# Patient Record
Sex: MALE | Race: WHITE | NOT HISPANIC OR LATINO
[De-identification: names, ages, dates, MRNs, and addresses within clinical notes are randomized per-mention and may not be internally consistent; named-entity substitution may affect disease eponyms.]

---

## 2017-03-23 DIAGNOSIS — Z12.11 ENCOUNTER FOR SCREENING FOR MALIGNANT NEOPLASM OF COLON: ICD-10-CM

## 2017-03-27 ENCOUNTER — OTHER (OUTPATIENT)
Age: 52
End: 2017-03-27

## 2017-04-24 ENCOUNTER — APPOINTMENT (OUTPATIENT)
Dept: SURGERY | Facility: HOSPITAL | Age: 52
End: 2017-04-24

## 2017-04-24 ENCOUNTER — OUTPATIENT (OUTPATIENT)
Dept: OUTPATIENT SERVICES | Facility: HOSPITAL | Age: 52
LOS: 1 days | End: 2017-04-24
Payer: COMMERCIAL

## 2017-04-24 DIAGNOSIS — Z12.11 ENCOUNTER FOR SCREENING FOR MALIGNANT NEOPLASM OF COLON: ICD-10-CM

## 2017-04-24 PROCEDURE — 45378 DIAGNOSTIC COLONOSCOPY: CPT

## 2017-04-26 ENCOUNTER — APPOINTMENT (OUTPATIENT)
Age: 52
End: 2017-04-26

## 2017-04-26 VITALS
BODY MASS INDEX: 27.92 KG/M2 | WEIGHT: 195 LBS | SYSTOLIC BLOOD PRESSURE: 142 MMHG | HEART RATE: 82 BPM | HEIGHT: 70 IN | DIASTOLIC BLOOD PRESSURE: 91 MMHG | OXYGEN SATURATION: 98 %

## 2017-04-26 RX ORDER — SODIUM PICOSULFATE, MAGNESIUM OXIDE, AND ANHYDROUS CITRIC ACID 10; 3.5; 12 MG/16.2G; G/16.2G; G/16.2G
10-3.5-12 POWDER, METERED ORAL
Qty: 1 | Refills: 0 | Status: DISCONTINUED | COMMUNITY
Start: 2017-03-24 | End: 2017-04-26

## 2017-05-15 ENCOUNTER — FORM ENCOUNTER (OUTPATIENT)
Age: 52
End: 2017-05-15

## 2017-05-16 ENCOUNTER — APPOINTMENT (OUTPATIENT)
Dept: MRI IMAGING | Facility: CLINIC | Age: 52
End: 2017-05-16

## 2017-05-16 ENCOUNTER — OUTPATIENT (OUTPATIENT)
Dept: OUTPATIENT SERVICES | Facility: HOSPITAL | Age: 52
LOS: 1 days | End: 2017-05-16
Payer: COMMERCIAL

## 2017-05-16 DIAGNOSIS — Z00.8 ENCOUNTER FOR OTHER GENERAL EXAMINATION: ICD-10-CM

## 2017-05-16 PROCEDURE — A9585: CPT

## 2017-05-16 PROCEDURE — 74183 MRI ABD W/O CNTR FLWD CNTR: CPT

## 2017-07-17 ENCOUNTER — OTHER (OUTPATIENT)
Age: 52
End: 2017-07-17

## 2017-07-17 LAB
AFP-TM SERPL-MCNC: 1.1 NG/ML
ALBUMIN SERPL ELPH-MCNC: 4.3 G/DL
ALP BLD-CCNC: 66 U/L
ALT SERPL-CCNC: 38 U/L
ANION GAP SERPL CALC-SCNC: 15 MMOL/L
AST SERPL-CCNC: 29 U/L
BASOPHILS # BLD AUTO: 0.01 K/UL
BASOPHILS NFR BLD AUTO: 0.2 %
BILIRUB SERPL-MCNC: 0.7 MG/DL
BUN SERPL-MCNC: 18 MG/DL
CALCIUM SERPL-MCNC: 9.3 MG/DL
CHLORIDE SERPL-SCNC: 105 MMOL/L
CHOLEST SERPL-MCNC: 217 MG/DL
CHOLEST/HDLC SERPL: 2.9 RATIO
CO2 SERPL-SCNC: 22 MMOL/L
CREAT SERPL-MCNC: 1.01 MG/DL
EOSINOPHIL # BLD AUTO: 0.16 K/UL
EOSINOPHIL NFR BLD AUTO: 2.5 %
ESTIMATED AVERAGE GLUCOSE: 105 MG/DL
FERRITIN SERPL-MCNC: 790 NG/ML
GGT SERPL-CCNC: 23 U/L
GLUCOSE SERPL-MCNC: 120 MG/DL
HBA1C MFR BLD HPLC: 5.3 %
HCT VFR BLD CALC: 42 %
HDLC SERPL-MCNC: 75 MG/DL
HGB BLD-MCNC: 14.6 G/DL
IMM GRANULOCYTES NFR BLD AUTO: 0.2 %
INR PPP: 0.97 RATIO
IRON SATN MFR SERPL: 33 %
IRON SERPL-MCNC: 85 UG/DL
LDLC SERPL CALC-MCNC: 126 MG/DL
LYMPHOCYTES # BLD AUTO: 2.26 K/UL
LYMPHOCYTES NFR BLD AUTO: 34.8 %
MAN DIFF?: NORMAL
MCHC RBC-ENTMCNC: 29.9 PG
MCHC RBC-ENTMCNC: 34.8 GM/DL
MCV RBC AUTO: 85.9 FL
MONOCYTES # BLD AUTO: 0.65 K/UL
MONOCYTES NFR BLD AUTO: 10 %
NEUTROPHILS # BLD AUTO: 3.4 K/UL
NEUTROPHILS NFR BLD AUTO: 52.3 %
PLATELET # BLD AUTO: 209 K/UL
POTASSIUM SERPL-SCNC: 4.3 MMOL/L
PROT SERPL-MCNC: 6.8 G/DL
PT BLD: 11 SEC
RBC # BLD: 4.89 M/UL
RBC # FLD: 12.6 %
SODIUM SERPL-SCNC: 142 MMOL/L
TIBC SERPL-MCNC: 254 UG/DL
TRIGL SERPL-MCNC: 79 MG/DL
UIBC SERPL-MCNC: 169 UG/DL
WBC # FLD AUTO: 6.49 K/UL

## 2017-07-20 ENCOUNTER — APPOINTMENT (OUTPATIENT)
Age: 52
End: 2017-07-20

## 2017-08-18 LAB — TM INTERPRETATION: NORMAL

## 2017-09-13 ENCOUNTER — APPOINTMENT (OUTPATIENT)
Age: 52
End: 2017-09-13

## 2017-09-13 ENCOUNTER — APPOINTMENT (OUTPATIENT)
Age: 52
End: 2017-09-13
Payer: COMMERCIAL

## 2017-09-13 VITALS
OXYGEN SATURATION: 98 % | HEART RATE: 86 BPM | HEIGHT: 70 IN | RESPIRATION RATE: 12 BRPM | TEMPERATURE: 98.9 F | WEIGHT: 196 LBS | BODY MASS INDEX: 28.06 KG/M2 | DIASTOLIC BLOOD PRESSURE: 98 MMHG | SYSTOLIC BLOOD PRESSURE: 136 MMHG

## 2017-09-13 PROCEDURE — 99214 OFFICE O/P EST MOD 30 MIN: CPT

## 2017-11-15 LAB
BASOPHILS # BLD AUTO: 0.01 K/UL
BASOPHILS NFR BLD AUTO: 0.2 %
EOSINOPHIL # BLD AUTO: 0.13 K/UL
EOSINOPHIL NFR BLD AUTO: 2.4
HCT VFR BLD CALC: 41.2 %
HGB BLD-MCNC: 13.8 G/DL
IMM GRANULOCYTES NFR BLD AUTO: 0.4 %
LYMPHOCYTES # BLD AUTO: 2.17 K/UL
LYMPHOCYTES NFR BLD AUTO: 40.7 %
MAN DIFF?: NORMAL
MCHC RBC-ENTMCNC: 30.3 PG
MCHC RBC-ENTMCNC: 33.5 GM/DL
MCV RBC AUTO: 90.5 FL
MONOCYTES # BLD AUTO: 0.34 K/UL
MONOCYTES NFR BLD AUTO: 6.4 %
NEUTROPHILS # BLD AUTO: 2.66 K/UL
NEUTROPHILS NFR BLD AUTO: 49.9 %
PLATELET # BLD AUTO: 220 K/UL
RBC # BLD: 4.55 M/UL
RBC # FLD: 13.2 %
WBC # FLD AUTO: 5.33 K/UL

## 2017-11-16 LAB
ALBUMIN SERPL ELPH-MCNC: 4.6 G/DL
ALP BLD-CCNC: 61 U/L
ALT SERPL-CCNC: 41 U/L
ANION GAP SERPL CALC-SCNC: 15 MMOL/L
AST SERPL-CCNC: 32 U/L
BILIRUB SERPL-MCNC: 0.5 MG/DL
BUN SERPL-MCNC: 23 MG/DL
CALCIUM SERPL-MCNC: 9.3 MG/DL
CHLORIDE SERPL-SCNC: 104 MMOL/L
CO2 SERPL-SCNC: 22 MMOL/L
CREAT SERPL-MCNC: 1.07 MG/DL
FERRITIN SERPL-MCNC: 374 NG/ML
GLUCOSE SERPL-MCNC: 108 MG/DL
IRON SATN MFR SERPL: 24 %
IRON SERPL-MCNC: 70 UG/DL
POTASSIUM SERPL-SCNC: 4.3 MMOL/L
PROT SERPL-MCNC: 6.7 G/DL
SODIUM SERPL-SCNC: 141 MMOL/L
TIBC SERPL-MCNC: 290 UG/DL
UIBC SERPL-MCNC: 220 UG/DL

## 2018-02-01 ENCOUNTER — APPOINTMENT (OUTPATIENT)
Dept: ULTRASOUND IMAGING | Facility: CLINIC | Age: 53
End: 2018-02-01
Payer: COMMERCIAL

## 2018-02-01 ENCOUNTER — OUTPATIENT (OUTPATIENT)
Dept: OUTPATIENT SERVICES | Facility: HOSPITAL | Age: 53
LOS: 1 days | End: 2018-02-01
Payer: COMMERCIAL

## 2018-02-01 DIAGNOSIS — Z00.8 ENCOUNTER FOR OTHER GENERAL EXAMINATION: ICD-10-CM

## 2018-02-01 DIAGNOSIS — E83.110 HEREDITARY HEMOCHROMATOSIS: ICD-10-CM

## 2018-02-01 PROCEDURE — 76700 US EXAM ABDOM COMPLETE: CPT

## 2018-02-01 PROCEDURE — 76700 US EXAM ABDOM COMPLETE: CPT | Mod: 26

## 2018-02-06 LAB
ALBUMIN SERPL ELPH-MCNC: 4.5 G/DL
ALP BLD-CCNC: 57 U/L
ALT SERPL-CCNC: 35 U/L
ANION GAP SERPL CALC-SCNC: 15 MMOL/L
AST SERPL-CCNC: 24 U/L
BILIRUB SERPL-MCNC: 0.5 MG/DL
BUN SERPL-MCNC: 19 MG/DL
CALCIUM SERPL-MCNC: 9.2 MG/DL
CHLORIDE SERPL-SCNC: 103 MMOL/L
CO2 SERPL-SCNC: 23 MMOL/L
CREAT SERPL-MCNC: 1.17 MG/DL
FERRITIN SERPL-MCNC: 65 NG/ML
GLUCOSE SERPL-MCNC: 110 MG/DL
IRON SATN MFR SERPL: 33 %
IRON SERPL-MCNC: 115 UG/DL
POTASSIUM SERPL-SCNC: 4.4 MMOL/L
PROT SERPL-MCNC: 6.6 G/DL
SODIUM SERPL-SCNC: 141 MMOL/L
TIBC SERPL-MCNC: 353 UG/DL
UIBC SERPL-MCNC: 238 UG/DL

## 2018-02-08 LAB
BASOPHILS # BLD AUTO: 0.02 K/UL
BASOPHILS NFR BLD AUTO: 0.4 %
EOSINOPHIL # BLD AUTO: 0.18 K/UL
EOSINOPHIL NFR BLD AUTO: 3.3 %
HCT VFR BLD CALC: 43.6 %
HGB BLD-MCNC: 13.8 G/DL
IMM GRANULOCYTES NFR BLD AUTO: 0.4 %
LYMPHOCYTES # BLD AUTO: 2.05 K/UL
LYMPHOCYTES NFR BLD AUTO: 37.5 %
MAN DIFF?: NORMAL
MCHC RBC-ENTMCNC: 28.2 PG
MCHC RBC-ENTMCNC: 31.7 GM/DL
MCV RBC AUTO: 89 FL
MONOCYTES # BLD AUTO: 0.42 K/UL
MONOCYTES NFR BLD AUTO: 7.7 %
NEUTROPHILS # BLD AUTO: 2.77 K/UL
NEUTROPHILS NFR BLD AUTO: 50.7 %
PLATELET # BLD AUTO: 204 K/UL
RBC # BLD: 4.9 M/UL
RBC # FLD: 12.8 %
WBC # FLD AUTO: 5.46 K/UL

## 2018-02-14 ENCOUNTER — APPOINTMENT (OUTPATIENT)
Age: 53
End: 2018-02-14
Payer: COMMERCIAL

## 2018-02-14 VITALS
BODY MASS INDEX: 28.06 KG/M2 | HEART RATE: 71 BPM | WEIGHT: 196 LBS | SYSTOLIC BLOOD PRESSURE: 136 MMHG | DIASTOLIC BLOOD PRESSURE: 92 MMHG | OXYGEN SATURATION: 98 % | TEMPERATURE: 98.4 F | HEIGHT: 70 IN

## 2018-02-14 PROCEDURE — 99214 OFFICE O/P EST MOD 30 MIN: CPT

## 2018-03-15 ENCOUNTER — APPOINTMENT (OUTPATIENT)
Dept: SURGERY | Facility: CLINIC | Age: 53
End: 2018-03-15
Payer: COMMERCIAL

## 2018-03-15 VITALS
HEART RATE: 73 BPM | SYSTOLIC BLOOD PRESSURE: 141 MMHG | OXYGEN SATURATION: 98 % | DIASTOLIC BLOOD PRESSURE: 97 MMHG | TEMPERATURE: 98.7 F | RESPIRATION RATE: 14 BRPM

## 2018-03-15 DIAGNOSIS — K60.4 RECTAL FISTULA: ICD-10-CM

## 2018-03-15 DIAGNOSIS — Z83.79 FAMILY HISTORY OF OTHER DISEASES OF THE DIGESTIVE SYSTEM: ICD-10-CM

## 2018-03-15 DIAGNOSIS — K62.5 HEMORRHAGE OF ANUS AND RECTUM: ICD-10-CM

## 2018-03-15 DIAGNOSIS — K62.89 OTHER SPECIFIED DISEASES OF ANUS AND RECTUM: ICD-10-CM

## 2018-03-15 DIAGNOSIS — K64.1 SECOND DEGREE HEMORRHOIDS: ICD-10-CM

## 2018-03-15 DIAGNOSIS — K64.9 UNSPECIFIED HEMORRHOIDS: ICD-10-CM

## 2018-03-15 PROCEDURE — 99214 OFFICE O/P EST MOD 30 MIN: CPT | Mod: 25

## 2018-03-15 PROCEDURE — 46221 LIGATION OF HEMORRHOID(S): CPT

## 2018-06-17 ENCOUNTER — FORM ENCOUNTER (OUTPATIENT)
Age: 53
End: 2018-06-17

## 2018-06-18 ENCOUNTER — APPOINTMENT (OUTPATIENT)
Dept: MRI IMAGING | Facility: CLINIC | Age: 53
End: 2018-06-18

## 2018-06-18 ENCOUNTER — OUTPATIENT (OUTPATIENT)
Dept: OUTPATIENT SERVICES | Facility: HOSPITAL | Age: 53
LOS: 1 days | End: 2018-06-18
Payer: COMMERCIAL

## 2018-06-18 DIAGNOSIS — Z00.8 ENCOUNTER FOR OTHER GENERAL EXAMINATION: ICD-10-CM

## 2018-06-18 PROCEDURE — 74183 MRI ABD W/O CNTR FLWD CNTR: CPT | Mod: 26

## 2018-06-18 PROCEDURE — 74183 MRI ABD W/O CNTR FLWD CNTR: CPT

## 2018-06-18 PROCEDURE — A9585: CPT

## 2018-06-26 LAB
AFP-TM SERPL-MCNC: 1.3 NG/ML
ALBUMIN SERPL ELPH-MCNC: 4.8 G/DL
ALP BLD-CCNC: 66 U/L
ALT SERPL-CCNC: 60 U/L
ANION GAP SERPL CALC-SCNC: 15 MMOL/L
AST SERPL-CCNC: 41 U/L
BASOPHILS # BLD AUTO: 0.02 K/UL
BASOPHILS NFR BLD AUTO: 0.3 %
BILIRUB SERPL-MCNC: 0.6 MG/DL
BUN SERPL-MCNC: 21 MG/DL
CALCIUM SERPL-MCNC: 9.6 MG/DL
CHLORIDE SERPL-SCNC: 104 MMOL/L
CHOLEST SERPL-MCNC: 235 MG/DL
CHOLEST/HDLC SERPL: 3.1 RATIO
CO2 SERPL-SCNC: 23 MMOL/L
CREAT SERPL-MCNC: 1.14 MG/DL
EOSINOPHIL # BLD AUTO: 0.12 K/UL
EOSINOPHIL NFR BLD AUTO: 2 %
ESTIMATED AVERAGE GLUCOSE: 108 MG/DL
FERRITIN SERPL-MCNC: 54 NG/ML
GGT SERPL-CCNC: 26 U/L
GLUCOSE SERPL-MCNC: 111 MG/DL
HBA1C MFR BLD HPLC: 5.4 %
HCT VFR BLD CALC: 45.2 %
HDLC SERPL-MCNC: 75 MG/DL
HGB BLD-MCNC: 15.1 G/DL
IMM GRANULOCYTES NFR BLD AUTO: 0.2 %
IRON SATN MFR SERPL: 25 %
IRON SERPL-MCNC: 91 UG/DL
LDLC SERPL CALC-MCNC: 144 MG/DL
LYMPHOCYTES # BLD AUTO: 2.16 K/UL
LYMPHOCYTES NFR BLD AUTO: 35.9 %
MAN DIFF?: NORMAL
MCHC RBC-ENTMCNC: 28.9 PG
MCHC RBC-ENTMCNC: 33.4 GM/DL
MCV RBC AUTO: 86.4 FL
MONOCYTES # BLD AUTO: 0.47 K/UL
MONOCYTES NFR BLD AUTO: 7.8 %
NEUTROPHILS # BLD AUTO: 3.23 K/UL
NEUTROPHILS NFR BLD AUTO: 53.8 %
PLATELET # BLD AUTO: 219 K/UL
POTASSIUM SERPL-SCNC: 4.6 MMOL/L
PROT SERPL-MCNC: 7.1 G/DL
RBC # BLD: 5.23 M/UL
RBC # FLD: 13 %
SODIUM SERPL-SCNC: 142 MMOL/L
TIBC SERPL-MCNC: 369 UG/DL
TRIGL SERPL-MCNC: 78 MG/DL
TSH SERPL-ACNC: 1.79 UIU/ML
UIBC SERPL-MCNC: 278 UG/DL
WBC # FLD AUTO: 6.01 K/UL

## 2018-07-11 ENCOUNTER — APPOINTMENT (OUTPATIENT)
Dept: HEPATOLOGY | Facility: CLINIC | Age: 53
End: 2018-07-11
Payer: COMMERCIAL

## 2018-07-11 VITALS
RESPIRATION RATE: 14 BRPM | WEIGHT: 194 LBS | OXYGEN SATURATION: 98 % | HEART RATE: 86 BPM | HEIGHT: 70 IN | SYSTOLIC BLOOD PRESSURE: 156 MMHG | BODY MASS INDEX: 27.77 KG/M2 | DIASTOLIC BLOOD PRESSURE: 104 MMHG

## 2018-07-11 PROCEDURE — 99214 OFFICE O/P EST MOD 30 MIN: CPT

## 2018-07-12 LAB — FERRITIN SERPL-MCNC: 79 NG/ML

## 2018-09-05 ENCOUNTER — OTHER (OUTPATIENT)
Age: 53
End: 2018-09-05

## 2018-11-20 ENCOUNTER — LABORATORY RESULT (OUTPATIENT)
Age: 53
End: 2018-11-20

## 2018-12-05 ENCOUNTER — APPOINTMENT (OUTPATIENT)
Dept: HEPATOLOGY | Facility: CLINIC | Age: 53
End: 2018-12-05
Payer: COMMERCIAL

## 2018-12-05 VITALS
HEART RATE: 83 BPM | HEIGHT: 70 IN | DIASTOLIC BLOOD PRESSURE: 93 MMHG | WEIGHT: 198 LBS | BODY MASS INDEX: 28.35 KG/M2 | OXYGEN SATURATION: 98 % | SYSTOLIC BLOOD PRESSURE: 147 MMHG

## 2018-12-05 DIAGNOSIS — Z83.49 FAMILY HISTORY OF OTHER ENDOCRINE, NUTRITIONAL AND METABOLIC DISEASES: ICD-10-CM

## 2018-12-05 PROCEDURE — 99214 OFFICE O/P EST MOD 30 MIN: CPT

## 2018-12-06 ENCOUNTER — APPOINTMENT (OUTPATIENT)
Dept: HEPATOLOGY | Facility: CLINIC | Age: 53
End: 2018-12-06

## 2019-05-07 LAB
AFP-TM SERPL-MCNC: <1.8 NG/ML
ALBUMIN SERPL ELPH-MCNC: 4.9 G/DL
ALP BLD-CCNC: 82 U/L
ALT SERPL-CCNC: 64 U/L
ANION GAP SERPL CALC-SCNC: 11 MMOL/L
AST SERPL-CCNC: 36 U/L
BASOPHILS # BLD AUTO: 0.03 K/UL
BASOPHILS NFR BLD AUTO: 0.5 %
BILIRUB SERPL-MCNC: 0.3 MG/DL
BUN SERPL-MCNC: 26 MG/DL
CALCIUM SERPL-MCNC: 9.7 MG/DL
CHLORIDE SERPL-SCNC: 103 MMOL/L
CHOLEST SERPL-MCNC: 263 MG/DL
CHOLEST/HDLC SERPL: 3.8 RATIO
CO2 SERPL-SCNC: 24 MMOL/L
CREAT SERPL-MCNC: 1.07 MG/DL
EOSINOPHIL # BLD AUTO: 0.2 K/UL
EOSINOPHIL NFR BLD AUTO: 3.4 %
ESTIMATED AVERAGE GLUCOSE: 111 MG/DL
FERRITIN SERPL-MCNC: 203 NG/ML
GLUCOSE SERPL-MCNC: 115 MG/DL
HBA1C MFR BLD HPLC: 5.5 %
HCT VFR BLD CALC: 45.2 %
HDLC SERPL-MCNC: 69 MG/DL
HGB BLD-MCNC: 15.4 G/DL
IMM GRANULOCYTES NFR BLD AUTO: 0.3 %
INR PPP: 0.91 RATIO
IRON SATN MFR SERPL: 18 %
IRON SERPL-MCNC: 61 UG/DL
LDLC SERPL CALC-MCNC: 178 MG/DL
LYMPHOCYTES # BLD AUTO: 2.05 K/UL
LYMPHOCYTES NFR BLD AUTO: 35.2 %
MAN DIFF?: NORMAL
MCHC RBC-ENTMCNC: 29.9 PG
MCHC RBC-ENTMCNC: 34.1 GM/DL
MCV RBC AUTO: 87.8 FL
MONOCYTES # BLD AUTO: 0.49 K/UL
MONOCYTES NFR BLD AUTO: 8.4 %
NEUTROPHILS # BLD AUTO: 3.03 K/UL
NEUTROPHILS NFR BLD AUTO: 52.2 %
PLATELET # BLD AUTO: 180 K/UL
POTASSIUM SERPL-SCNC: 4.9 MMOL/L
PROT SERPL-MCNC: 7.1 G/DL
PT BLD: 10.3 SEC
RBC # BLD: 5.15 M/UL
RBC # FLD: 12.5 %
SODIUM SERPL-SCNC: 138 MMOL/L
TIBC SERPL-MCNC: 333 UG/DL
TRIGL SERPL-MCNC: 78 MG/DL
UIBC SERPL-MCNC: 272 UG/DL
WBC # FLD AUTO: 5.82 K/UL

## 2019-05-27 ENCOUNTER — FORM ENCOUNTER (OUTPATIENT)
Age: 54
End: 2019-05-27

## 2019-05-28 ENCOUNTER — APPOINTMENT (OUTPATIENT)
Dept: ULTRASOUND IMAGING | Facility: CLINIC | Age: 54
End: 2019-05-28
Payer: COMMERCIAL

## 2019-05-28 ENCOUNTER — OUTPATIENT (OUTPATIENT)
Dept: OUTPATIENT SERVICES | Facility: HOSPITAL | Age: 54
LOS: 1 days | End: 2019-05-28
Payer: COMMERCIAL

## 2019-05-28 DIAGNOSIS — K76.0 FATTY (CHANGE OF) LIVER, NOT ELSEWHERE CLASSIFIED: ICD-10-CM

## 2019-05-28 PROCEDURE — 76700 US EXAM ABDOM COMPLETE: CPT

## 2019-05-28 PROCEDURE — 76700 US EXAM ABDOM COMPLETE: CPT | Mod: 26

## 2019-06-12 ENCOUNTER — APPOINTMENT (OUTPATIENT)
Dept: HEPATOLOGY | Facility: CLINIC | Age: 54
End: 2019-06-12
Payer: COMMERCIAL

## 2019-06-12 VITALS
HEIGHT: 70 IN | BODY MASS INDEX: 29.63 KG/M2 | SYSTOLIC BLOOD PRESSURE: 140 MMHG | HEART RATE: 67 BPM | DIASTOLIC BLOOD PRESSURE: 88 MMHG | WEIGHT: 207 LBS | OXYGEN SATURATION: 98 %

## 2019-06-12 PROCEDURE — 99214 OFFICE O/P EST MOD 30 MIN: CPT

## 2019-06-12 NOTE — PHYSICAL EXAM
[Scleral Icterus] : No Scleral Icterus [Spider Angioma] : No spider angioma(s) were observed [Hepatojugular Reflux] : patient did not have a sustained hepatojugular reflux [Abdominal Bruit] : no abdominal bruit [Ascites Fluid Wave] : no ascites fluid wave [Abdominal  Ascites] : no ascites [Ascites Shifting Dullness] : no shifting dullness of ascites [Ascites Tense] : ascites is not tense [Splenomegaly] : no splenomegaly [Caput Medusae] : no caput medusae observed [Liver Size (___ Cm)] : Liver size [unfilled] cm [Liver Palpable ___ Finger Breadths Below Costal Margin] : Liver edge was palpable [unfilled] finger breadths below costal margin [Smooth] : smooth [Non-Tender] : non-tender [Palmar Erythema] : no Palmar Erythema [Jaundice] : No jaundice [Asterixis] : no asterixis observed [Hallucinations] : ~T no ~M hallucinations [Depression] : no depression [Suicidal Ideation] : no suicidal ideation [Delusions] : no ~T delusions [Homicidal Ideation] : no homicidal ideations [Preoccupiation With Violent Thoughts] : no preoccupation with violent thoughts [General Appearance - In No Acute Distress] : in no acute distress [General Appearance - Alert] : alert [PERRL With Normal Accommodation] : pupils were equal in size, round, and reactive to light [Extraocular Movements] : extraocular movements were intact [Sclera] : the sclera and conjunctiva were normal [Outer Ear] : the ears and nose were normal in appearance [Neck Appearance] : the appearance of the neck was normal [Oropharynx] : the oropharynx was normal [Neck Cervical Mass (___cm)] : no neck mass was observed [Jugular Venous Distention Increased] : there was no jugular-venous distention [Heart Sounds] : normal S1 and S2 [Heart Rate And Rhythm] : heart rate was normal and rhythm regular [Auscultation Breath Sounds / Voice Sounds] : lungs were clear to auscultation bilaterally [Full Pulse] : the pedal pulses are present [Heart Sounds Gallop] : no gallops [Bowel Sounds] : normal bowel sounds [Edema] : there was no peripheral edema [Abdomen Soft] : soft [] : no hepato-splenomegaly [Abdomen Mass (___ Cm)] : no abdominal mass palpated [Abdomen Tenderness] : non-tender [Abnormal Walk] : normal gait [Supraclavicular Lymph Nodes Enlarged Bilaterally] : supraclavicular [No CVA Tenderness] : no ~M costovertebral angle tenderness [Musculoskeletal - Swelling] : no joint swelling seen [Nail Clubbing] : no clubbing  or cyanosis of the fingernails [Motor Tone] : muscle strength and tone were normal [Skin Color & Pigmentation] : normal skin color and pigmentation [Skin Turgor] : normal skin turgor [Deep Tendon Reflexes (DTR)] : deep tendon reflexes were 2+ and symmetric [Sensation] : the sensory exam was normal to light touch and pinprick [Impaired Insight] : insight and judgment were intact [Oriented To Time, Place, And Person] : oriented to person, place, and time [No Focal Deficits] : no focal deficits [Affect] : the affect was normal

## 2019-06-12 NOTE — ASSESSMENT
[FreeTextEntry1] : This patient was genetic hemochromatosis, has evidence of fatty liver disease, and has recently gained 10 pounds with increases and cholesterol.  The patient is advised to seek advice of his primary care physician regarding his hyperlipidemia, and to redouble his efforts on weight loss.  I will order phlebotomy now and a repeat phlebotomy in 4 months with followup in 6 months.  The patient's iron levels appear to be well controlled.  However, he does appear to be developing fatty liver disease with associated metabolic abnormalities.

## 2019-06-12 NOTE — HISTORY OF PRESENT ILLNESS
[IV Drug Use] : no IV drug use [Infected Sexual Partner] : no infected sexual partner [Needlestick Exposure] : no needlestick exposure [Body Piercing] : no body piercing [Tattoo] : no tattoos [Transplant before 1992] : no transplant before 1992 [Hemodialysis] : no hemodialysis [Transfusion before 1992] : no transfusion before 1992 [Incarceration] : no incarceration [Alcohol Abuse] : no alcohol abuse [Autoimmune Disorder] : no autoimmune disorder [Travel to Endemic Area] : no travel to an endemic area [Household Contact to HBV] : no household contact to HBV [Occupational Exposure] : no occupational exposure [Moderate] : moderate in severity [Cocaine Use] : no cocaine use [Fatigue] : denies fatigue [Unchanged] : unchanged [Malaise] : denies malaise [Fever] : denies fever [Muscle Aches, Generalized (Myalgias)] : denies myalgias [Yellow Skin Or Eyes (Jaundice)] : denies jaundice [Abdominal Pain] : denies abdominal pain [Urine Tests Nonspecific Abnormal Findings Biliuria] : denies dark urine [Light Colored Bowel Movement (Acholic Stools)] : denies pale stools [Insomnia] : denies insomnia [Itching (Pruritus)] : denies pruritus [Skin: Rash] : denies rash [Shortness Of Breath] : denies shortness of breath [Wt Loss ___ Lbs] : no recent weight loss [Wt Gain ___ Lbs] : recent [unfilled] ~Upound(s) weight gain [Diffuse Joint Pain (Arthralgias)] : arthralgias [de-identified] : This patient has genetic hemochromatosis H63D.  has undergone phlebotomy in the past with ferratin values showing a consistent PICC line.  During the past year.  He has not had phlebotomy.  Because MRI revealed no excess iron in the liver and ferratin values were low with an iron saturation less than 20%.  During the past year.  Ferretin values have begun to rise with values now about 200.  Iron saturation, however, remains normal.  The patient has had an abdominal ultrasound in May of 2019 showing liver fat without any suspicious liver masses.  Prior MRI also showed liver fat of 22%.  The patient has gained approximately 10 pounds over the past 6 months and LDL cholesterol levels have risen with the most recent value being 178.  The patient is not on treatment for hyperlipidemia.  He does not have evidence of diabetes, with normal hemoglobin A1c.\par \par The patient does not have general arthritis.  he does have pain in his left thumb.  He has had no cardiac symptoms.

## 2019-06-12 NOTE — REVIEW OF SYSTEMS
[Fever] : no fever [Chills] : no chills [Feeling Poorly] : not feeling poorly [Recent Weight Gain (___ Lbs)] : recent [unfilled] ~Ulb weight gain [Feeling Tired] : not feeling tired [Recent Weight Loss (___ Lbs)] : no recent weight loss [Negative] : Heme/Lymph

## 2019-06-12 NOTE — CONSULT LETTER
[Courtesy Letter:] : I had the pleasure of seeing your patient, [unfilled], in my office today. [Dear  ___] : Dear  [unfilled], [Please see my note below.] : Please see my note below. [Sincerely,] : Sincerely, [Consult Closing:] : Thank you very much for allowing me to participate in the care of this patient.  If you have any questions, please do not hesitate to contact me. [FreeTextEntry3] : Fabio Cross Jr., M.D.\par [facility]\par [address]\[place] [address]\par [phone]\par

## 2019-11-08 ENCOUNTER — TRANSCRIPTION ENCOUNTER (OUTPATIENT)
Age: 54
End: 2019-11-08

## 2019-12-09 LAB
ALBUMIN SERPL ELPH-MCNC: 4.7 G/DL
ALP BLD-CCNC: 59 U/L
ALT SERPL-CCNC: 48 U/L
ANION GAP SERPL CALC-SCNC: 15 MMOL/L
AST SERPL-CCNC: 29 U/L
BASOPHILS # BLD AUTO: 0.03 K/UL
BASOPHILS NFR BLD AUTO: 0.6 %
BILIRUB SERPL-MCNC: 0.8 MG/DL
BUN SERPL-MCNC: 15 MG/DL
CALCIUM SERPL-MCNC: 9.5 MG/DL
CHLORIDE SERPL-SCNC: 102 MMOL/L
CHOLEST SERPL-MCNC: 221 MG/DL
CHOLEST/HDLC SERPL: 2.9 RATIO
CO2 SERPL-SCNC: 22 MMOL/L
CREAT SERPL-MCNC: 1.04 MG/DL
EOSINOPHIL # BLD AUTO: 0.11 K/UL
EOSINOPHIL NFR BLD AUTO: 2 %
ESTIMATED AVERAGE GLUCOSE: 105 MG/DL
FERRITIN SERPL-MCNC: 179 NG/ML
GLUCOSE SERPL-MCNC: 108 MG/DL
HBA1C MFR BLD HPLC: 5.3 %
HCT VFR BLD CALC: 45.6 %
HDLC SERPL-MCNC: 76 MG/DL
HGB BLD-MCNC: 15.2 G/DL
IMM GRANULOCYTES NFR BLD AUTO: 0.4 %
INR PPP: 0.95 RATIO
IRON SATN MFR SERPL: 44 %
IRON SERPL-MCNC: 148 UG/DL
LDLC SERPL CALC-MCNC: 131 MG/DL
LYMPHOCYTES # BLD AUTO: 2.17 K/UL
LYMPHOCYTES NFR BLD AUTO: 40.2 %
MAN DIFF?: NORMAL
MCHC RBC-ENTMCNC: 29.7 PG
MCHC RBC-ENTMCNC: 33.3 GM/DL
MCV RBC AUTO: 89.2 FL
MONOCYTES # BLD AUTO: 0.45 K/UL
MONOCYTES NFR BLD AUTO: 8.3 %
NEUTROPHILS # BLD AUTO: 2.62 K/UL
NEUTROPHILS NFR BLD AUTO: 48.5 %
PLATELET # BLD AUTO: 215 K/UL
POTASSIUM SERPL-SCNC: 4.6 MMOL/L
PROT SERPL-MCNC: 6.7 G/DL
PT BLD: 10.8 SEC
RBC # BLD: 5.11 M/UL
RBC # FLD: 12.2 %
SODIUM SERPL-SCNC: 139 MMOL/L
TIBC SERPL-MCNC: 335 UG/DL
TRIGL SERPL-MCNC: 69 MG/DL
UIBC SERPL-MCNC: 187 UG/DL
WBC # FLD AUTO: 5.4 K/UL

## 2019-12-11 LAB — AFP-TM SERPL-MCNC: 1.8 NG/ML

## 2019-12-13 ENCOUNTER — APPOINTMENT (OUTPATIENT)
Dept: HEPATOLOGY | Facility: CLINIC | Age: 54
End: 2019-12-13
Payer: COMMERCIAL

## 2019-12-13 VITALS
HEIGHT: 70 IN | BODY MASS INDEX: 28.06 KG/M2 | DIASTOLIC BLOOD PRESSURE: 87 MMHG | OXYGEN SATURATION: 98 % | WEIGHT: 196 LBS | HEART RATE: 76 BPM | SYSTOLIC BLOOD PRESSURE: 135 MMHG

## 2019-12-13 PROCEDURE — 99214 OFFICE O/P EST MOD 30 MIN: CPT

## 2019-12-13 NOTE — CONSULT LETTER
[Dear  ___] : Dear  [unfilled], [Courtesy Letter:] : I had the pleasure of seeing your patient, [unfilled], in my office today. [Please see my note below.] : Please see my note below. [Sincerely,] : Sincerely, [FreeTextEntry3] : Fabio Cross Jr., M.D.\par [facility]\par [address]\[place] [address]\par [phone]\par

## 2019-12-13 NOTE — HISTORY OF PRESENT ILLNESS
[Infected Sexual Partner] : no infected sexual partner [Needlestick Exposure] : no needlestick exposure [IV Drug Use] : no IV drug use [Tattoo] : no tattoos [Body Piercing] : no body piercing [Hemodialysis] : no hemodialysis [Incarceration] : no incarceration [Transfusion before 1992] : no transfusion before 1992 [Transplant before 1992] : no transplant before 1992 [Autoimmune Disorder] : no autoimmune disorder [Alcohol Abuse] : no alcohol abuse [Household Contact to HBV] : no household contact to HBV [Travel to Endemic Area] : no travel to an endemic area [Cocaine Use] : no cocaine use [Occupational Exposure] : no occupational exposure [Unchanged] : unchanged [Moderate] : moderate in severity [Diffuse Joint Pain (Arthralgias)] : denies arthralgias [Fever] : denies fever [Malaise] : denies malaise [Fatigue] : denies fatigue [Muscle Aches, Generalized (Myalgias)] : denies myalgias [Yellow Skin Or Eyes (Jaundice)] : denies jaundice [Abdominal Pain] : denies abdominal pain [Urine Tests Nonspecific Abnormal Findings Biliuria] : denies dark urine [Light Colored Bowel Movement (Acholic Stools)] : denies pale stools [Itching (Pruritus)] : denies pruritus [Insomnia] : denies insomnia [Skin: Rash] : denies rash [Wt Loss ___ Lbs] : no recent weight loss [Wt Gain ___ Lbs] : no recent weight gain [Shortness Of Breath] : denies shortness of breath [de-identified] : This patient has genetic [id number].  he's undergone phlebotomy and oral iron levels with one phlebotomy having been performed.  Over the past 6 months.  He has no complaints of arthritis.  No cardiac complaints nor endocrine abnormalities.  On the last visit.  It was noted that his cholesterol had increased.  An abdominal ultrasound in May of 2020 revealed fatty liver.  The patient does not have diabetes.\par \par The patient's weight is about 195 pounds.  The patient does not drink alcohol.\par \par Iron saturation of pain in December of 2019 shows an iron saturation presented to 44% with ferritin of 179.  These values show an increased over levels in 2018.

## 2019-12-13 NOTE — PHYSICAL EXAM
[Scleral Icterus] : No Scleral Icterus [Spider Angioma] : No spider angioma(s) were observed [Hepatojugular Reflux] : patient did not have a sustained hepatojugular reflux [Abdominal Bruit] : no abdominal bruit [Ascites Fluid Wave] : no ascites fluid wave [Abdominal  Ascites] : no ascites [Ascites Tense] : ascites is not tense [Ascites Shifting Dullness] : no shifting dullness of ascites [Splenomegaly] : no splenomegaly [Liver Size (___ Cm)] : Liver size [unfilled] cm [Caput Medusae] : no caput medusae observed [Liver Palpable ___ Finger Breadths Below Costal Margin] : Liver edge was palpable [unfilled] finger breadths below costal margin [Non-Tender] : non-tender [Jaundice] : No jaundice [Smooth] : smooth [Asterixis] : no asterixis observed [Palmar Erythema] : no Palmar Erythema [Depression] : no depression [Hallucinations] : ~T no ~M hallucinations [Delusions] : no ~T delusions [Homicidal Ideation] : no homicidal ideations [Suicidal Ideation] : no suicidal ideation [Preoccupiation With Violent Thoughts] : no preoccupation with violent thoughts [General Appearance - Alert] : alert [General Appearance - In No Acute Distress] : in no acute distress [Sclera] : the sclera and conjunctiva were normal [PERRL With Normal Accommodation] : pupils were equal in size, round, and reactive to light [Outer Ear] : the ears and nose were normal in appearance [Extraocular Movements] : extraocular movements were intact [Oropharynx] : the oropharynx was normal [Neck Cervical Mass (___cm)] : no neck mass was observed [Neck Appearance] : the appearance of the neck was normal [Jugular Venous Distention Increased] : there was no jugular-venous distention [Heart Rate And Rhythm] : heart rate was normal and rhythm regular [Auscultation Breath Sounds / Voice Sounds] : lungs were clear to auscultation bilaterally [Heart Sounds Gallop] : no gallops [Heart Sounds] : normal S1 and S2 [Full Pulse] : the pedal pulses are present [Bowel Sounds] : normal bowel sounds [Abdomen Soft] : soft [Edema] : there was no peripheral edema [] : no hepato-splenomegaly [Abdomen Mass (___ Cm)] : no abdominal mass palpated [Abdomen Tenderness] : non-tender [Supraclavicular Lymph Nodes Enlarged Bilaterally] : supraclavicular [No CVA Tenderness] : no ~M costovertebral angle tenderness [Abnormal Walk] : normal gait [Nail Clubbing] : no clubbing  or cyanosis of the fingernails [Musculoskeletal - Swelling] : no joint swelling seen [Motor Tone] : muscle strength and tone were normal [Skin Turgor] : normal skin turgor [Deep Tendon Reflexes (DTR)] : deep tendon reflexes were 2+ and symmetric [Skin Color & Pigmentation] : normal skin color and pigmentation [Sensation] : the sensory exam was normal to light touch and pinprick [No Focal Deficits] : no focal deficits [Oriented To Time, Place, And Person] : oriented to person, place, and time [Affect] : the affect was normal [Impaired Insight] : insight and judgment were intact

## 2019-12-13 NOTE — REVIEW OF SYSTEMS
[Fever] : no fever [Chills] : no chills [Feeling Poorly] : not feeling poorly [Feeling Tired] : not feeling tired [Recent Weight Gain (___ Lbs)] : recent [unfilled] ~Ulb weight gain [Recent Weight Loss (___ Lbs)] : no recent weight loss [Negative] : Endocrine

## 2019-12-13 NOTE — ASSESSMENT
[FreeTextEntry1] : This patient.  His hemachromatosis has shown some increase in iron stores over the past 6 months and I will suggest phlebotomy one unit every 2 months with a return visit in 6 months.  The patient will have laboratory tests before his next visit.  I have also advised that his serum cholesterol is elevated and management of his weight with modest reduction of about 10 pounds and dietary modification to avoid high cholesterol foods.  May help him bring his lipid values under control.  There is no elevation of triglycerides noted.

## 2020-04-03 ENCOUNTER — TRANSCRIPTION ENCOUNTER (OUTPATIENT)
Age: 55
End: 2020-04-03

## 2020-04-06 ENCOUNTER — TRANSCRIPTION ENCOUNTER (OUTPATIENT)
Age: 55
End: 2020-04-06

## 2020-04-07 ENCOUNTER — APPOINTMENT (OUTPATIENT)
Dept: DISASTER EMERGENCY | Facility: CLINIC | Age: 55
End: 2020-04-07

## 2020-06-12 LAB
ALBUMIN SERPL ELPH-MCNC: 4.9 G/DL
ALP BLD-CCNC: 60 U/L
ALT SERPL-CCNC: 58 U/L
ANION GAP SERPL CALC-SCNC: 11 MMOL/L
AST SERPL-CCNC: 41 U/L
BASOPHILS # BLD AUTO: 0.03 K/UL
BASOPHILS NFR BLD AUTO: 0.7 %
BILIRUB SERPL-MCNC: 0.5 MG/DL
BUN SERPL-MCNC: 14 MG/DL
CALCIUM SERPL-MCNC: 9.2 MG/DL
CHLORIDE SERPL-SCNC: 104 MMOL/L
CHOLEST SERPL-MCNC: 234 MG/DL
CHOLEST/HDLC SERPL: 3 RATIO
CO2 SERPL-SCNC: 24 MMOL/L
CREAT SERPL-MCNC: 0.99 MG/DL
EOSINOPHIL # BLD AUTO: 0.12 K/UL
EOSINOPHIL NFR BLD AUTO: 2.7 %
FERRITIN SERPL-MCNC: 74 NG/ML
GLUCOSE SERPL-MCNC: 113 MG/DL
HCT VFR BLD CALC: 44.2 %
HDLC SERPL-MCNC: 77 MG/DL
HGB BLD-MCNC: 14.8 G/DL
IMM GRANULOCYTES NFR BLD AUTO: 0.2 %
IRON SATN MFR SERPL: 21 %
IRON SERPL-MCNC: 76 UG/DL
LDLC SERPL CALC-MCNC: 143 MG/DL
LYMPHOCYTES # BLD AUTO: 1.79 K/UL
LYMPHOCYTES NFR BLD AUTO: 40.6 %
MAN DIFF?: NORMAL
MCHC RBC-ENTMCNC: 29.2 PG
MCHC RBC-ENTMCNC: 33.5 GM/DL
MCV RBC AUTO: 87.4 FL
MONOCYTES # BLD AUTO: 0.4 K/UL
MONOCYTES NFR BLD AUTO: 9.1 %
NEUTROPHILS # BLD AUTO: 2.06 K/UL
NEUTROPHILS NFR BLD AUTO: 46.7 %
PLATELET # BLD AUTO: 206 K/UL
POTASSIUM SERPL-SCNC: 4.7 MMOL/L
PROT SERPL-MCNC: 6.7 G/DL
RBC # BLD: 5.06 M/UL
RBC # FLD: 13.4 %
SODIUM SERPL-SCNC: 140 MMOL/L
TIBC SERPL-MCNC: 365 UG/DL
TRIGL SERPL-MCNC: 73 MG/DL
UIBC SERPL-MCNC: 289 UG/DL
WBC # FLD AUTO: 4.41 K/UL

## 2020-06-13 ENCOUNTER — APPOINTMENT (OUTPATIENT)
Dept: ULTRASOUND IMAGING | Facility: CLINIC | Age: 55
End: 2020-06-13
Payer: COMMERCIAL

## 2020-06-13 ENCOUNTER — RESULT REVIEW (OUTPATIENT)
Age: 55
End: 2020-06-13

## 2020-06-13 ENCOUNTER — OUTPATIENT (OUTPATIENT)
Dept: OUTPATIENT SERVICES | Facility: HOSPITAL | Age: 55
LOS: 1 days | End: 2020-06-13
Payer: COMMERCIAL

## 2020-06-13 DIAGNOSIS — K76.0 FATTY (CHANGE OF) LIVER, NOT ELSEWHERE CLASSIFIED: ICD-10-CM

## 2020-06-13 DIAGNOSIS — E83.110 HEREDITARY HEMOCHROMATOSIS: ICD-10-CM

## 2020-06-13 PROCEDURE — 76700 US EXAM ABDOM COMPLETE: CPT | Mod: 26

## 2020-06-13 PROCEDURE — 76700 US EXAM ABDOM COMPLETE: CPT

## 2020-06-15 LAB — AFP-TM SERPL-MCNC: <1.8 NG/ML

## 2020-06-17 ENCOUNTER — APPOINTMENT (OUTPATIENT)
Dept: HEPATOLOGY | Facility: CLINIC | Age: 55
End: 2020-06-17
Payer: COMMERCIAL

## 2020-06-17 VITALS
HEIGHT: 70 IN | SYSTOLIC BLOOD PRESSURE: 154 MMHG | WEIGHT: 210 LBS | OXYGEN SATURATION: 99 % | DIASTOLIC BLOOD PRESSURE: 112 MMHG | BODY MASS INDEX: 30.06 KG/M2 | HEART RATE: 64 BPM

## 2020-06-17 VITALS — SYSTOLIC BLOOD PRESSURE: 145 MMHG | DIASTOLIC BLOOD PRESSURE: 107 MMHG

## 2020-06-17 PROCEDURE — 99214 OFFICE O/P EST MOD 30 MIN: CPT

## 2020-06-17 NOTE — ASSESSMENT
[FreeTextEntry1] : This patient with genetic hemochromatosis, also has fatty liver.  The patient reports that he is drinking.\par And has not lost weight.  I will obtain an abdominal MRI to again characterize quantitative liver fat and iron.  I have suggested that the patient stop all alcohol and focus on a modest body weight reduction.  The patient will continue phlebotomy every 4 months in September and January.  I will see the patient again in followup in 6 months.  The patient will take copies of his laboratory tests to discuss potential use of statins to control hyperlipidemia with his primary care physician.

## 2020-06-17 NOTE — PHYSICAL EXAM
[Scleral Icterus] : No Scleral Icterus [Hepatojugular Reflux] : patient did not have a sustained hepatojugular reflux [Spider Angioma] : No spider angioma(s) were observed [Abdominal Bruit] : no abdominal bruit [Abdominal  Ascites] : no ascites [Ascites Tense] : ascites is not tense [Ascites Fluid Wave] : no ascites fluid wave [Ascites Shifting Dullness] : no shifting dullness of ascites [Splenomegaly] : no splenomegaly [Caput Medusae] : no caput medusae observed [Liver Size (___ Cm)] : Liver size [unfilled] cm [Liver Palpable ___ Finger Breadths Below Costal Margin] : Liver edge was palpable [unfilled] finger breadths below costal margin [Non-Tender] : non-tender [Asterixis] : no asterixis observed [Smooth] : smooth [Palmar Erythema] : no Palmar Erythema [Depression] : no depression [Jaundice] : No jaundice [Delusions] : no ~T delusions [Hallucinations] : ~T no ~M hallucinations [Preoccupiation With Violent Thoughts] : no preoccupation with violent thoughts [Homicidal Ideation] : no homicidal ideations [Suicidal Ideation] : no suicidal ideation [General Appearance - Alert] : alert [General Appearance - In No Acute Distress] : in no acute distress [Extraocular Movements] : extraocular movements were intact [Sclera] : the sclera and conjunctiva were normal [PERRL With Normal Accommodation] : pupils were equal in size, round, and reactive to light [Oropharynx] : the oropharynx was normal [Outer Ear] : the ears and nose were normal in appearance [Neck Cervical Mass (___cm)] : no neck mass was observed [Neck Appearance] : the appearance of the neck was normal [Jugular Venous Distention Increased] : there was no jugular-venous distention [Heart Rate And Rhythm] : heart rate was normal and rhythm regular [Auscultation Breath Sounds / Voice Sounds] : lungs were clear to auscultation bilaterally [Heart Sounds] : normal S1 and S2 [Heart Sounds Gallop] : no gallops [Full Pulse] : the pedal pulses are present [Bowel Sounds] : normal bowel sounds [Edema] : there was no peripheral edema [Abdomen Tenderness] : non-tender [Abdomen Soft] : soft [Abdomen Mass (___ Cm)] : no abdominal mass palpated [] : no hepato-splenomegaly [No CVA Tenderness] : no ~M costovertebral angle tenderness [Abnormal Walk] : normal gait [Supraclavicular Lymph Nodes Enlarged Bilaterally] : supraclavicular [Motor Tone] : muscle strength and tone were normal [Nail Clubbing] : no clubbing  or cyanosis of the fingernails [Musculoskeletal - Swelling] : no joint swelling seen [Skin Turgor] : normal skin turgor [Skin Color & Pigmentation] : normal skin color and pigmentation [No Focal Deficits] : no focal deficits [Sensation] : the sensory exam was normal to light touch and pinprick [Deep Tendon Reflexes (DTR)] : deep tendon reflexes were 2+ and symmetric [Affect] : the affect was normal [Oriented To Time, Place, And Person] : oriented to person, place, and time [Impaired Insight] : insight and judgment were intact

## 2020-06-17 NOTE — HISTORY OF PRESENT ILLNESS
[Moderate] : moderate in severity [Unchanged] : unchanged [Needlestick Exposure] : no needlestick exposure [Infected Sexual Partner] : no infected sexual partner [IV Drug Use] : no IV drug use [Tattoo] : no tattoos [Body Piercing] : no body piercing [Hemodialysis] : no hemodialysis [Transfusion before 1992] : no transfusion before 1992 [Transplant before 1992] : no transplant before 1992 [Incarceration] : no incarceration [Household Contact to HBV] : no household contact to HBV [Alcohol Abuse] : no alcohol abuse [Autoimmune Disorder] : no autoimmune disorder [Travel to Endemic Area] : no travel to an endemic area [Occupational Exposure] : no occupational exposure [Cocaine Use] : no cocaine use [Fatigue] : denies fatigue [Malaise] : denies malaise [Fever] : denies fever [Diffuse Joint Pain (Arthralgias)] : denies arthralgias [Muscle Aches, Generalized (Myalgias)] : denies myalgias [Yellow Skin Or Eyes (Jaundice)] : denies jaundice [Abdominal Pain] : denies abdominal pain [Light Colored Bowel Movement (Acholic Stools)] : denies pale stools [Urine Tests Nonspecific Abnormal Findings Biliuria] : denies dark urine [Insomnia] : denies insomnia [Skin: Rash] : denies rash [Shortness Of Breath] : denies shortness of breath [Itching (Pruritus)] : denies pruritus [Wt Gain ___ Lbs] : no recent weight gain [Wt Loss ___ Lbs] : no recent weight loss [de-identified] : This patient has H63D genetic hemochromatosis.  He has undergone phlebotomy.  Depending upon iron stores.  In December 2019 patient had iron saturation of 44% with a ferritin of 179.  He has undergone phlebotomy every 2 months with recent iron saturation of 21% with ferritin value of 70.  The patient has cut down his drinking of alcohol, but states that he does drink wine.  His body weight is about 200 pounds.  The patient feels well with no specific symptoms.  Lipid values have shown elevation of cholesterol that has not been treated.  The patient does not have clinical history of arteriosclerotic heart disease.  Abdominal MRI has shown evidence of fatty liver with 22% fat saturation.  Recent laboratory testing shows mild elevation of aminotransferase values in spite of the reduction in iron stores.

## 2020-07-07 ENCOUNTER — APPOINTMENT (OUTPATIENT)
Dept: MRI IMAGING | Facility: CLINIC | Age: 55
End: 2020-07-07
Payer: COMMERCIAL

## 2020-07-07 ENCOUNTER — OUTPATIENT (OUTPATIENT)
Dept: OUTPATIENT SERVICES | Facility: HOSPITAL | Age: 55
LOS: 1 days | End: 2020-07-07
Payer: COMMERCIAL

## 2020-07-07 ENCOUNTER — RESULT REVIEW (OUTPATIENT)
Age: 55
End: 2020-07-07

## 2020-07-07 DIAGNOSIS — K76.0 FATTY (CHANGE OF) LIVER, NOT ELSEWHERE CLASSIFIED: ICD-10-CM

## 2020-07-07 PROCEDURE — A9585: CPT

## 2020-07-07 PROCEDURE — 74183 MRI ABD W/O CNTR FLWD CNTR: CPT | Mod: 26

## 2020-07-07 PROCEDURE — 74183 MRI ABD W/O CNTR FLWD CNTR: CPT

## 2020-10-05 ENCOUNTER — APPOINTMENT (OUTPATIENT)
Dept: RADIOLOGY | Facility: CLINIC | Age: 55
End: 2020-10-05
Payer: COMMERCIAL

## 2020-10-05 ENCOUNTER — OUTPATIENT (OUTPATIENT)
Dept: OUTPATIENT SERVICES | Facility: HOSPITAL | Age: 55
LOS: 1 days | End: 2020-10-05
Payer: COMMERCIAL

## 2020-10-05 DIAGNOSIS — Z00.8 ENCOUNTER FOR OTHER GENERAL EXAMINATION: ICD-10-CM

## 2020-10-05 PROCEDURE — 71046 X-RAY EXAM CHEST 2 VIEWS: CPT | Mod: 26

## 2020-10-05 PROCEDURE — 71046 X-RAY EXAM CHEST 2 VIEWS: CPT

## 2020-10-05 PROCEDURE — 72070 X-RAY EXAM THORAC SPINE 2VWS: CPT

## 2020-10-05 PROCEDURE — 72070 X-RAY EXAM THORAC SPINE 2VWS: CPT | Mod: 26

## 2021-01-08 ENCOUNTER — APPOINTMENT (OUTPATIENT)
Dept: HEPATOLOGY | Facility: CLINIC | Age: 56
End: 2021-01-08
Payer: COMMERCIAL

## 2021-01-08 VITALS
HEIGHT: 70 IN | OXYGEN SATURATION: 98 % | BODY MASS INDEX: 29.78 KG/M2 | DIASTOLIC BLOOD PRESSURE: 85 MMHG | WEIGHT: 208 LBS | HEART RATE: 62 BPM | SYSTOLIC BLOOD PRESSURE: 138 MMHG

## 2021-01-08 PROCEDURE — 99214 OFFICE O/P EST MOD 30 MIN: CPT | Mod: 25

## 2021-01-08 PROCEDURE — 36415 COLL VENOUS BLD VENIPUNCTURE: CPT

## 2021-01-08 PROCEDURE — 99072 ADDL SUPL MATRL&STAF TM PHE: CPT

## 2021-01-08 NOTE — REVIEW OF SYSTEMS
[Recent Weight Gain (___ Lbs)] : recent [unfilled] ~Ulb weight gain [Negative] : Heme/Lymph [Fever] : no fever [Chills] : no chills [Feeling Poorly] : not feeling poorly [Feeling Tired] : not feeling tired [Recent Weight Loss (___ Lbs)] : no recent weight loss

## 2021-01-08 NOTE — ASSESSMENT
[FreeTextEntry1] : #1genetic hemochromatosis-this patient has genetic hemochromatosis.  However, iron stores have been depleted.  The patient will have a iron studies today with a determination of the need for therapeutic phlebotomy.  MRI has shown normal liver stores of iron\par \par #2 fatty liver-MRI has revealed significant fatty liver and the patient drinks alcohol on a daily basis along with a 10 pound weight gain.  I advise the patientsignificantly reduce his alcohol intake and lose10 pounds two-way less than 200 pounds body weight.  Mild aminotransferase elevation is likely related to his fatty liver disease.  \par \par #3 hyperlipidemia - this patient has mild elevation ofcholesterol And diet management and weight loss, and reassessment of his blood, lipids.  We'll determine if statin therapy will be necessary

## 2021-01-08 NOTE — PHYSICAL EXAM
[Liver Size (___ Cm)] : Liver size [unfilled] cm [Non-Tender] : non-tender [Smooth] : smooth [General Appearance - Alert] : alert [General Appearance - In No Acute Distress] : in no acute distress [Sclera] : the sclera and conjunctiva were normal [PERRL With Normal Accommodation] : pupils were equal in size, round, and reactive to light [Extraocular Movements] : extraocular movements were intact [Outer Ear] : the ears and nose were normal in appearance [Oropharynx] : the oropharynx was normal [Neck Appearance] : the appearance of the neck was normal [Neck Cervical Mass (___cm)] : no neck mass was observed [Jugular Venous Distention Increased] : there was no jugular-venous distention [Respiration, Rhythm And Depth] : normal respiratory rhythm and effort [Exaggerated Use Of Accessory Muscles For Inspiration] : no accessory muscle use [Heart Rate And Rhythm] : heart rate was normal and rhythm regular [Full Pulse] : the pedal pulses are present [Edema] : there was no peripheral edema [Bowel Sounds] : normal bowel sounds [Abdomen Soft] : soft [Abdomen Tenderness] : non-tender [] : no hepato-splenomegaly [Abdomen Mass (___ Cm)] : no abdominal mass palpated [Supraclavicular Lymph Nodes Enlarged Bilaterally] : supraclavicular [No CVA Tenderness] : no ~M costovertebral angle tenderness [Abnormal Walk] : normal gait [Nail Clubbing] : no clubbing  or cyanosis of the fingernails [Musculoskeletal - Swelling] : no joint swelling seen [Motor Tone] : muscle strength and tone were normal [Skin Color & Pigmentation] : normal skin color and pigmentation [Skin Turgor] : normal skin turgor [Deep Tendon Reflexes (DTR)] : deep tendon reflexes were 2+ and symmetric [Sensation] : the sensory exam was normal to light touch and pinprick [No Focal Deficits] : no focal deficits [Oriented To Time, Place, And Person] : oriented to person, place, and time [Impaired Insight] : insight and judgment were intact [Affect] : the affect was normal [Scleral Icterus] : No Scleral Icterus [Hepatojugular Reflux] : patient did not have a sustained hepatojugular reflux [Spider Angioma] : No spider angioma(s) were observed [Abdominal Bruit] : no abdominal bruit [Abdominal  Ascites] : no ascites [Ascites Fluid Wave] : no ascites fluid wave [Ascites Tense] : ascites is not tense [Ascites Shifting Dullness] : no shifting dullness of ascites [Splenomegaly] : no splenomegaly [Caput Medusae] : no caput medusae observed [Liver Palpable ___ Finger Breadths Below Costal Margin] : was not palpable below costal margin [Asterixis] : no asterixis observed [Jaundice] : No jaundice [Palmar Erythema] : no Palmar Erythema [Depression] : no depression [Hallucinations] : ~T no ~M hallucinations [Delusions] : no ~T delusions [Suicidal Ideation] : no suicidal ideation [Homicidal Ideation] : no homicidal ideations [Preoccupiation With Violent Thoughts] : no preoccupation with violent thoughts

## 2021-01-08 NOTE — HISTORY OF PRESENT ILLNESS
[Moderate] : moderate in severity [Unchanged] : unchanged [Fatigue] : denies fatigue [Malaise] : denies malaise [Fever] : denies fever [Diffuse Joint Pain (Arthralgias)] : arthralgias stable [Muscle Aches, Generalized (Myalgias)] : denies myalgias [Yellow Skin Or Eyes (Jaundice)] : denies jaundice [Abdominal Pain] : denies abdominal pain [Urine Tests Nonspecific Abnormal Findings Biliuria] : denies dark urine [Light Colored Bowel Movement (Acholic Stools)] : denies pale stools [Insomnia] : denies insomnia [Skin: Rash] : denies rash [Itching (Pruritus)] : denies pruritus [Shortness Of Breath] : denies shortness of breath [Wt Gain ___ Lbs] : recent [unfilled] ~Upound(s) weight gain [Wt Loss ___ Lbs] : no recent weight loss [de-identified] : This patient has history of H63D genetic hemochromatosis , and has had phlebotomy.  As needed.  Previously, ferritin level was markedly elevated and has come down to 74.  The patient has had MRI showing normal iron concentration in the liver.  The patient has iron saturation percent of 20% and has had his last phlebotomy in December of 2020.  His rate of phlebotomy was 2 or 3 units per year.  The patient has no cardiac disease.  He does complain of some stiffness of his joints of his hands.  He does not have diabetes.\par \par The patient has had MRI showing significant hepatic steatosis.  The patient drinks alcohol typically wine on a daily basis and his body.  Weight has gradually increased about 10 pounds over the past year.\par \par

## 2021-01-11 LAB
AFP-TM SERPL-MCNC: <1.8 NG/ML
ALBUMIN SERPL ELPH-MCNC: 4.7 G/DL
ALP BLD-CCNC: 77 U/L
ALT SERPL-CCNC: 107 U/L
ANION GAP SERPL CALC-SCNC: 14 MMOL/L
AST SERPL-CCNC: 51 U/L
BASOPHILS # BLD AUTO: 0.04 K/UL
BASOPHILS NFR BLD AUTO: 0.6 %
BILIRUB SERPL-MCNC: 0.3 MG/DL
BUN SERPL-MCNC: 20 MG/DL
CALCIUM SERPL-MCNC: 9.4 MG/DL
CHLORIDE SERPL-SCNC: 105 MMOL/L
CHOLEST SERPL-MCNC: 256 MG/DL
CO2 SERPL-SCNC: 22 MMOL/L
CREAT SERPL-MCNC: 1.07 MG/DL
EOSINOPHIL # BLD AUTO: 0.13 K/UL
EOSINOPHIL NFR BLD AUTO: 1.9 %
ESTIMATED AVERAGE GLUCOSE: 105 MG/DL
FERRITIN SERPL-MCNC: 106 NG/ML
GLUCOSE SERPL-MCNC: 95 MG/DL
HBA1C MFR BLD HPLC: 5.3 %
HCT VFR BLD CALC: 46.4 %
HDLC SERPL-MCNC: 69 MG/DL
HGB BLD-MCNC: 15.3 G/DL
IMM GRANULOCYTES NFR BLD AUTO: 0.3 %
INR PPP: 0.95 RATIO
IRON SATN MFR SERPL: 23 %
IRON SERPL-MCNC: 93 UG/DL
LDLC SERPL CALC-MCNC: 166 MG/DL
LYMPHOCYTES # BLD AUTO: 2.08 K/UL
LYMPHOCYTES NFR BLD AUTO: 30.1 %
MAN DIFF?: NORMAL
MCHC RBC-ENTMCNC: 29.5 PG
MCHC RBC-ENTMCNC: 33 GM/DL
MCV RBC AUTO: 89.6 FL
MONOCYTES # BLD AUTO: 0.55 K/UL
MONOCYTES NFR BLD AUTO: 7.9 %
NEUTROPHILS # BLD AUTO: 4.1 K/UL
NEUTROPHILS NFR BLD AUTO: 59.2 %
NONHDLC SERPL-MCNC: 187 MG/DL
PLATELET # BLD AUTO: 210 K/UL
POTASSIUM SERPL-SCNC: 4.5 MMOL/L
PROT SERPL-MCNC: 7.1 G/DL
PT BLD: 11.2 SEC
RBC # BLD: 5.18 M/UL
RBC # FLD: 12 %
SODIUM SERPL-SCNC: 141 MMOL/L
TIBC SERPL-MCNC: 397 UG/DL
TRIGL SERPL-MCNC: 105 MG/DL
UIBC SERPL-MCNC: 304 UG/DL
WBC # FLD AUTO: 6.92 K/UL

## 2021-01-25 ENCOUNTER — NON-APPOINTMENT (OUTPATIENT)
Age: 56
End: 2021-01-25

## 2021-08-06 ENCOUNTER — APPOINTMENT (OUTPATIENT)
Dept: HEPATOLOGY | Facility: CLINIC | Age: 56
End: 2021-08-06

## 2021-09-17 LAB
ALBUMIN SERPL ELPH-MCNC: 4.9 G/DL
ALP BLD-CCNC: 75 U/L
ALT SERPL-CCNC: 100 U/L
ANION GAP SERPL CALC-SCNC: 19 MMOL/L
AST SERPL-CCNC: 41 U/L
BASOPHILS # BLD AUTO: 0.03 K/UL
BASOPHILS NFR BLD AUTO: 0.5 %
BILIRUB SERPL-MCNC: 0.8 MG/DL
BUN SERPL-MCNC: 15 MG/DL
CALCIUM SERPL-MCNC: 9.7 MG/DL
CHLORIDE SERPL-SCNC: 99 MMOL/L
CO2 SERPL-SCNC: 23 MMOL/L
CREAT SERPL-MCNC: 1.13 MG/DL
EOSINOPHIL # BLD AUTO: 0.11 K/UL
EOSINOPHIL NFR BLD AUTO: 1.8 %
FERRITIN SERPL-MCNC: 600 NG/ML
HCT VFR BLD CALC: 46.4 %
HGB BLD-MCNC: 16 G/DL
IMM GRANULOCYTES NFR BLD AUTO: 0.3 %
IRON SATN MFR SERPL: 40 %
IRON SERPL-MCNC: 144 UG/DL
LYMPHOCYTES # BLD AUTO: 2.54 K/UL
LYMPHOCYTES NFR BLD AUTO: 42.6 %
MAN DIFF?: NORMAL
MCHC RBC-ENTMCNC: 30.5 PG
MCHC RBC-ENTMCNC: 34.5 GM/DL
MCV RBC AUTO: 88.5 FL
MONOCYTES # BLD AUTO: 0.6 K/UL
MONOCYTES NFR BLD AUTO: 10.1 %
NEUTROPHILS # BLD AUTO: 2.66 K/UL
NEUTROPHILS NFR BLD AUTO: 44.7 %
PLATELET # BLD AUTO: 205 K/UL
POTASSIUM SERPL-SCNC: 4.8 MMOL/L
PROT SERPL-MCNC: 7 G/DL
RBC # BLD: 5.24 M/UL
RBC # FLD: 12.4 %
SODIUM SERPL-SCNC: 141 MMOL/L
TIBC SERPL-MCNC: 359 UG/DL
UIBC SERPL-MCNC: 215 UG/DL
WBC # FLD AUTO: 5.96 K/UL

## 2021-09-30 ENCOUNTER — APPOINTMENT (OUTPATIENT)
Dept: HEPATOLOGY | Facility: CLINIC | Age: 56
End: 2021-09-30
Payer: COMMERCIAL

## 2021-09-30 VITALS
HEIGHT: 70 IN | HEART RATE: 80 BPM | DIASTOLIC BLOOD PRESSURE: 93 MMHG | WEIGHT: 210 LBS | SYSTOLIC BLOOD PRESSURE: 143 MMHG | OXYGEN SATURATION: 97 % | BODY MASS INDEX: 30.06 KG/M2

## 2021-09-30 DIAGNOSIS — E78.00 PURE HYPERCHOLESTEROLEMIA, UNSPECIFIED: ICD-10-CM

## 2021-09-30 DIAGNOSIS — R94.5 ABNORMAL RESULTS OF LIVER FUNCTION STUDIES: ICD-10-CM

## 2021-09-30 PROCEDURE — 36415 COLL VENOUS BLD VENIPUNCTURE: CPT

## 2021-09-30 PROCEDURE — 99214 OFFICE O/P EST MOD 30 MIN: CPT | Mod: 25

## 2021-09-30 NOTE — HISTORY OF PRESENT ILLNESS
[Moderate] : moderate in severity [Worsening] : worsening [Fatigue] : denies fatigue [Malaise] : denies malaise [Fever] : denies fever [Diffuse Joint Pain (Arthralgias)] : arthralgias worsened [Muscle Aches, Generalized (Myalgias)] : denies myalgias [Yellow Skin Or Eyes (Jaundice)] : denies jaundice [Abdominal Pain] : denies abdominal pain [Urine Tests Nonspecific Abnormal Findings Biliuria] : denies dark urine [Light Colored Bowel Movement (Acholic Stools)] : denies pale stools [Insomnia] : denies insomnia [Skin: Rash] : denies rash [Itching (Pruritus)] : denies pruritus [Shortness Of Breath] : denies shortness of breath [Wt Gain ___ Lbs] : no recent weight gain [Wt Loss ___ Lbs] : no recent weight loss [de-identified] : This patient has history of H63D genetic hemochromatosis , and has had phlebotomy. As needed. Previously, ferritin level was markedly elevated and has come down to 74. The patient has had MRI showing normal iron concentration in the liver. The patient has iron saturation percent of 20% and has had his last phlebotomy in December of 2020. His rate of phlebotomy was 2 or 3 units per year. The patient has no cardiac disease. He does complain of some stiffness of his joints of his hands. He does not have diabetes.\par \par The patient has had MRI showing significant hepatic steatosis. The patient drinks alcohol typically wine on a daily basis he has reduced recently.\par \par This patient, who has reduced his phlebotomy scheduled during 2021 and iron saturation has increased to 40% aminotransferase values have also increased.\par \par The patient has suffered tendon injury to his right shoulder, requiring surgical repair.  He is currently out of work and rehabilitating from his shoulder surgery.

## 2021-09-30 NOTE — PHYSICAL EXAM
[Scleral Icterus] : No Scleral Icterus [Hepatojugular Reflux] : patient did not have a sustained hepatojugular reflux [Spider Angioma] : No spider angioma(s) were observed [Abdominal Bruit] : no abdominal bruit [Abdominal  Ascites] : no ascites [Ascites Fluid Wave] : no ascites fluid wave [Ascites Tense] : ascites is not tense [Ascites Shifting Dullness] : no shifting dullness of ascites [Splenomegaly] : no splenomegaly [Caput Medusae] : no caput medusae observed [Liver Size (___ Cm)] : Liver size [unfilled] cm [Liver Palpable ___ Finger Breadths Below Costal Margin] : was not palpable below costal margin [Non-Tender] : non-tender [Smooth] : smooth [Asterixis] : no asterixis observed [Jaundice] : No jaundice [Palmar Erythema] : no Palmar Erythema [Depression] : no depression [Hallucinations] : ~T no ~M hallucinations [Delusions] : no ~T delusions [Suicidal Ideation] : no suicidal ideation [Homicidal Ideation] : no homicidal ideations [Preoccupiation With Violent Thoughts] : no preoccupation with violent thoughts [General Appearance - Alert] : alert [General Appearance - In No Acute Distress] : in no acute distress [Sclera] : the sclera and conjunctiva were normal [PERRL With Normal Accommodation] : pupils were equal in size, round, and reactive to light [Extraocular Movements] : extraocular movements were intact [Outer Ear] : the ears and nose were normal in appearance [Oropharynx] : the oropharynx was normal [Neck Appearance] : the appearance of the neck was normal [Neck Cervical Mass (___cm)] : no neck mass was observed [Jugular Venous Distention Increased] : there was no jugular-venous distention [Respiration, Rhythm And Depth] : normal respiratory rhythm and effort [Exaggerated Use Of Accessory Muscles For Inspiration] : no accessory muscle use [Heart Rate And Rhythm] : heart rate was normal and rhythm regular [Full Pulse] : the pedal pulses are present [Edema] : there was no peripheral edema [Bowel Sounds] : normal bowel sounds [Abdomen Soft] : soft [Abdomen Tenderness] : non-tender [] : no hepato-splenomegaly [Abdomen Mass (___ Cm)] : no abdominal mass palpated [Supraclavicular Lymph Nodes Enlarged Bilaterally] : supraclavicular [No CVA Tenderness] : no ~M costovertebral angle tenderness [Abnormal Walk] : normal gait [Nail Clubbing] : no clubbing  or cyanosis of the fingernails [Motor Tone] : muscle strength and tone were normal [Musculoskeletal - Swelling] : no joint swelling seen [Skin Color & Pigmentation] : normal skin color and pigmentation [Skin Turgor] : normal skin turgor [Deep Tendon Reflexes (DTR)] : deep tendon reflexes were 2+ and symmetric [Sensation] : the sensory exam was normal to light touch and pinprick [No Focal Deficits] : no focal deficits [Oriented To Time, Place, And Person] : oriented to person, place, and time [Impaired Insight] : insight and judgment were intact [Affect] : the affect was normal

## 2021-09-30 NOTE — ASSESSMENT
[FreeTextEntry1] : This patient with genetic hemochromatosis H63D the patient will increase phlebotomy.  I will order one unit phlebotomy every 2 weeks for 5 sessions.  The patient will have repeat laboratory testing in November 2021.  The patient also had abdominal ultrasound and is returned to her visit in 3 months.\par \par Patient's weight has been stable.  I have advised modest weight loss over this may be difficult with his current shoulder injury and physical therapy.  The patient is advised to abstain from all alcohol, which will also help to address his fatty liver identified on prior MRI.

## 2021-09-30 NOTE — CONSULT LETTER
[Dear  ___] : Dear  [unfilled], [Courtesy Letter:] : I had the pleasure of seeing your patient, [unfilled], in my office today. [Please see my note below.] : Please see my note below. [Sincerely,] : Sincerely, [FreeTextEntry3] : Fabio Cross Jr., M.D.\par [facility]\par [address]\[place] [address]\par [phone]\par

## 2021-10-04 LAB
CHOLEST SERPL-MCNC: 276 MG/DL
ESTIMATED AVERAGE GLUCOSE: 108 MG/DL
HBA1C MFR BLD HPLC: 5.4 %
HBV SURFACE AB SER QL: REACTIVE
HBV SURFACE AG SER QL: NONREACTIVE
HCV AB SER QL: NONREACTIVE
HCV S/CO RATIO: 0.08 S/CO
HDLC SERPL-MCNC: 74 MG/DL
LDLC SERPL CALC-MCNC: 169 MG/DL
NONHDLC SERPL-MCNC: 201 MG/DL
TRIGL SERPL-MCNC: 162 MG/DL

## 2021-10-05 LAB
ALPHA-1-FETOPROTEIN-L3: NORMAL %
ALPHA-1-FETOPROTEIN: 1.5 NG/ML

## 2021-10-14 LAB
LYSOSOMAL ACID LIPASE INTERPRETATION: NORMAL
LYSOSOMAL ACID LIPASE: 63 CD:384473389

## 2021-11-11 ENCOUNTER — APPOINTMENT (OUTPATIENT)
Dept: SURGERY | Facility: CLINIC | Age: 56
End: 2021-11-11
Payer: COMMERCIAL

## 2021-11-11 VITALS
SYSTOLIC BLOOD PRESSURE: 148 MMHG | TEMPERATURE: 97.9 F | DIASTOLIC BLOOD PRESSURE: 95 MMHG | WEIGHT: 206 LBS | RESPIRATION RATE: 16 BRPM | BODY MASS INDEX: 29.49 KG/M2 | HEIGHT: 70 IN | OXYGEN SATURATION: 97 % | HEART RATE: 66 BPM

## 2021-11-11 DIAGNOSIS — Z78.9 OTHER SPECIFIED HEALTH STATUS: ICD-10-CM

## 2021-11-11 DIAGNOSIS — K64.5 PERIANAL VENOUS THROMBOSIS: ICD-10-CM

## 2021-11-11 PROCEDURE — 46600 DIAGNOSTIC ANOSCOPY SPX: CPT

## 2021-11-11 PROCEDURE — 99213 OFFICE O/P EST LOW 20 MIN: CPT | Mod: 25

## 2021-11-11 NOTE — ASSESSMENT
[FreeTextEntry1] : Patient with resolving left lateral internal hemorrhoid thrombosis.  His pain is already improving.  Topical hydrocortisone prescribed.  Patient will call my office if his symptoms do not completely resolve.

## 2021-11-11 NOTE — HISTORY OF PRESENT ILLNESS
[FreeTextEntry1] : Onu is a 57 y/o male here for a consultation. He is s/p 11/5/2013- fistulotomy and suture ligation of hemorrhoids. \par \par Pt was last seen 3/15/2018- Anal inspection unremarkable. Healed wound from fistulotomy. Digital exam and anoscopy demonstrated moderate internal hemorrhoid enlargement. The right anterior hemorrhoid had squamous metaplasia consistent with intermittent prolapse.  The right anterior hemorrhoid was banded and post band instruction sheet was reviewed. \par \par Colonoscopy from 4/24/2017- hemorrhoids found on perianal exam. The anus to ileum is normal. No specimen. \par \par Two weeks pt was on vacation when he developed acute rectal pain and pressure. Pain occurred mostly when he coughed. Rectal pain subsided after 1-2 days. Rectal pain reoccurred yesterday. Has been eating a lot of "solid" and thinks this could have contributed. Believes he may have felt a small rectal lump. This morning pt passed a soft stool and pain was somewhat worse. Pain lasts a few hours after BM. Over the past yr pt had two episodes of BRBPR, dripping into the toilet. Bleeding resolved when he stood up. Pt is not taking any blood thinners. Pt normally has one soft BMs daily. Denies incontinence.

## 2021-11-11 NOTE — PHYSICAL EXAM
[Normal Breath Sounds] : Normal breath sounds [Normal Heart Sounds] : normal heart sounds [No Rash or Lesion] : No rash or lesion [Alert] : alert [Oriented to Person] : oriented to person [Oriented to Place] : oriented to place [Oriented to Time] : oriented to time [Calm] : calm [JVD] : no jugular venous distention  [de-identified] : WNL  [de-identified] : WNL [de-identified] : Full ROM [FreeTextEntry1] : Perianal inspection unremarkable.  Digital exam revealed a tender firm left lateral internal hemorrhoid.  Anoscopy revealed thrombosis of the left lateral internal hemorrhoid

## 2021-11-11 NOTE — PHYSICAL EXAM
[Normal Breath Sounds] : Normal breath sounds [Normal Heart Sounds] : normal heart sounds [No Rash or Lesion] : No rash or lesion [Alert] : alert [Oriented to Place] : oriented to place [Oriented to Person] : oriented to person [Oriented to Time] : oriented to time [Calm] : calm [JVD] : no jugular venous distention  [de-identified] : WNL  [de-identified] : WNL [de-identified] : Full ROM [FreeTextEntry1] : Perianal inspection unremarkable.  Digital exam revealed a tender firm left lateral internal hemorrhoid.  Anoscopy revealed thrombosis of the left lateral internal hemorrhoid

## 2021-12-07 ENCOUNTER — APPOINTMENT (OUTPATIENT)
Dept: ULTRASOUND IMAGING | Facility: CLINIC | Age: 56
End: 2021-12-07
Payer: COMMERCIAL

## 2021-12-07 ENCOUNTER — OUTPATIENT (OUTPATIENT)
Dept: OUTPATIENT SERVICES | Facility: HOSPITAL | Age: 56
LOS: 1 days | End: 2021-12-07
Payer: COMMERCIAL

## 2021-12-07 DIAGNOSIS — K76.0 FATTY (CHANGE OF) LIVER, NOT ELSEWHERE CLASSIFIED: ICD-10-CM

## 2021-12-07 DIAGNOSIS — Z00.8 ENCOUNTER FOR OTHER GENERAL EXAMINATION: ICD-10-CM

## 2021-12-07 PROCEDURE — 76700 US EXAM ABDOM COMPLETE: CPT

## 2021-12-07 PROCEDURE — 76700 US EXAM ABDOM COMPLETE: CPT | Mod: 26

## 2021-12-16 LAB
ALBUMIN SERPL ELPH-MCNC: 4.5 G/DL
ALP BLD-CCNC: 66 U/L
ALT SERPL-CCNC: 58 U/L
ANION GAP SERPL CALC-SCNC: 10 MMOL/L
AST SERPL-CCNC: 34 U/L
BASOPHILS # BLD AUTO: 0.03 K/UL
BASOPHILS NFR BLD AUTO: 0.7 %
BILIRUB SERPL-MCNC: 0.4 MG/DL
BUN SERPL-MCNC: 15 MG/DL
CALCIUM SERPL-MCNC: 9.2 MG/DL
CHLORIDE SERPL-SCNC: 106 MMOL/L
CO2 SERPL-SCNC: 25 MMOL/L
CREAT SERPL-MCNC: 1.02 MG/DL
EOSINOPHIL # BLD AUTO: 0.16 K/UL
EOSINOPHIL NFR BLD AUTO: 3.6 %
FERRITIN SERPL-MCNC: 36 NG/ML
GLUCOSE SERPL-MCNC: 105 MG/DL
HCT VFR BLD CALC: 38 %
HGB BLD-MCNC: 12.4 G/DL
IMM GRANULOCYTES NFR BLD AUTO: 0.2 %
IRON SATN MFR SERPL: 10 %
IRON SERPL-MCNC: 39 UG/DL
LYMPHOCYTES # BLD AUTO: 2.11 K/UL
LYMPHOCYTES NFR BLD AUTO: 47.5 %
MAN DIFF?: NORMAL
MCHC RBC-ENTMCNC: 29.3 PG
MCHC RBC-ENTMCNC: 32.6 GM/DL
MCV RBC AUTO: 89.8 FL
MONOCYTES # BLD AUTO: 0.46 K/UL
MONOCYTES NFR BLD AUTO: 10.4 %
NEUTROPHILS # BLD AUTO: 1.67 K/UL
NEUTROPHILS NFR BLD AUTO: 37.6 %
PLATELET # BLD AUTO: 228 K/UL
POTASSIUM SERPL-SCNC: 4.8 MMOL/L
PROT SERPL-MCNC: 6.2 G/DL
RBC # BLD: 4.23 M/UL
RBC # FLD: 12.1 %
SODIUM SERPL-SCNC: 140 MMOL/L
TIBC SERPL-MCNC: 389 UG/DL
UIBC SERPL-MCNC: 351 UG/DL
WBC # FLD AUTO: 4.44 K/UL

## 2021-12-23 ENCOUNTER — APPOINTMENT (OUTPATIENT)
Dept: HEPATOLOGY | Facility: CLINIC | Age: 56
End: 2021-12-23
Payer: COMMERCIAL

## 2021-12-23 VITALS
OXYGEN SATURATION: 97 % | HEART RATE: 94 BPM | DIASTOLIC BLOOD PRESSURE: 89 MMHG | SYSTOLIC BLOOD PRESSURE: 138 MMHG | HEIGHT: 70 IN

## 2021-12-23 DIAGNOSIS — E83.110 HEREDITARY HEMOCHROMATOSIS: ICD-10-CM

## 2021-12-23 PROCEDURE — 99214 OFFICE O/P EST MOD 30 MIN: CPT

## 2021-12-23 RX ORDER — HYDROCORTISONE 2.5% 25 MG/G
2.5 CREAM TOPICAL
Qty: 30 | Refills: 0 | Status: DISCONTINUED | COMMUNITY
Start: 2021-11-11 | End: 2021-12-23

## 2021-12-23 NOTE — HISTORY OF PRESENT ILLNESS
[Moderate] : moderate in severity [Unchanged] : unchanged [Fatigue] : denies fatigue [Malaise] : denies malaise [Fever] : denies fever [Diffuse Joint Pain (Arthralgias)] : denies arthralgias [Muscle Aches, Generalized (Myalgias)] : denies myalgias [Yellow Skin Or Eyes (Jaundice)] : denies jaundice [Abdominal Pain] : denies abdominal pain [Urine Tests Nonspecific Abnormal Findings Biliuria] : denies dark urine [Light Colored Bowel Movement (Acholic Stools)] : denies pale stools [Insomnia] : denies insomnia [Skin: Rash] : denies rash [Itching (Pruritus)] : denies pruritus [Shortness Of Breath] : denies shortness of breath [Wt Gain ___ Lbs] : no recent weight gain [Wt Loss ___ Lbs] : no recent weight loss [de-identified] : This patient has history of H63D genetic hemochromatosis.  He has had phlebotomy with control of iron overload.  Most recently he had stopped phlebotomy and ferritin values micheal to 600.  Over the past 3 months.  The patient has had 5 sessions of phlebotomy and ferritin value has fallen to 34 with decreased iron saturation.  The patient currently feels well.  He has no cardiac complaints.  No arthritis.  He has not lost weight with his current weight of approximately 205 pounds.  He has attempted to reduce alcohol intake to occasional wine

## 2021-12-23 NOTE — PHYSICAL EXAM
[Scleral Icterus] : No Scleral Icterus [Hepatojugular Reflux] : patient did not have a sustained hepatojugular reflux [Spider Angioma] : No spider angioma(s) were observed [Abdominal Bruit] : no abdominal bruit [Abdominal  Ascites] : no ascites [Ascites Fluid Wave] : no ascites fluid wave [Ascites Tense] : ascites is not tense [Ascites Shifting Dullness] : no shifting dullness of ascites [Splenomegaly] : no splenomegaly [Caput Medusae] : no caput medusae observed [Liver Size (___ Cm)] : Liver size [unfilled] cm [Liver Palpable ___ Finger Breadths Below Costal Margin] : was not palpable below costal margin [Non-Tender] : non-tender [Smooth] : smooth [Asterixis] : no asterixis observed [Jaundice] : No jaundice [Palmar Erythema] : no Palmar Erythema [Depression] : no depression [Hallucinations] : ~T no ~M hallucinations [Delusions] : no ~T delusions [Suicidal Ideation] : no suicidal ideation [Homicidal Ideation] : no homicidal ideations [Preoccupiation With Violent Thoughts] : no preoccupation with violent thoughts [General Appearance - Alert] : alert [General Appearance - In No Acute Distress] : in no acute distress [Sclera] : the sclera and conjunctiva were normal [PERRL With Normal Accommodation] : pupils were equal in size, round, and reactive to light [Extraocular Movements] : extraocular movements were intact [Outer Ear] : the ears and nose were normal in appearance [Oropharynx] : the oropharynx was normal [Neck Appearance] : the appearance of the neck was normal [Neck Cervical Mass (___cm)] : no neck mass was observed [Jugular Venous Distention Increased] : there was no jugular-venous distention [Respiration, Rhythm And Depth] : normal respiratory rhythm and effort [Exaggerated Use Of Accessory Muscles For Inspiration] : no accessory muscle use [Heart Rate And Rhythm] : heart rate was normal and rhythm regular [Full Pulse] : the pedal pulses are present [Edema] : there was no peripheral edema [Bowel Sounds] : normal bowel sounds [Abdomen Soft] : soft [Abdomen Tenderness] : non-tender [] : no hepato-splenomegaly [Abdomen Mass (___ Cm)] : no abdominal mass palpated [Supraclavicular Lymph Nodes Enlarged Bilaterally] : supraclavicular [No CVA Tenderness] : no ~M costovertebral angle tenderness [Abnormal Walk] : normal gait [Nail Clubbing] : no clubbing  or cyanosis of the fingernails [Musculoskeletal - Swelling] : no joint swelling seen [Motor Tone] : muscle strength and tone were normal [Skin Color & Pigmentation] : normal skin color and pigmentation [Skin Turgor] : normal skin turgor [Deep Tendon Reflexes (DTR)] : deep tendon reflexes were 2+ and symmetric [Sensation] : the sensory exam was normal to light touch and pinprick [No Focal Deficits] : no focal deficits [Oriented To Time, Place, And Person] : oriented to person, place, and time [Impaired Insight] : insight and judgment were intact [Affect] : the affect was normal

## 2021-12-23 NOTE — ASSESSMENT
[FreeTextEntry1] : This patient has undergone phlebotomy with reduction in iron stores.  I will reduce phlebotomy to every 3 months and continue to monitor the patient with iron studies and liver tests.  The patient has had fatty liver identified on abdominal ultrasound and will continue to attempt.  Weight reduction.  I will see the patient back in 6 months.

## 2022-03-26 ENCOUNTER — OUTPATIENT (OUTPATIENT)
Dept: OUTPATIENT SERVICES | Facility: HOSPITAL | Age: 57
LOS: 1 days | End: 2022-03-26
Payer: COMMERCIAL

## 2022-03-26 ENCOUNTER — APPOINTMENT (OUTPATIENT)
Dept: RADIOLOGY | Facility: CLINIC | Age: 57
End: 2022-03-26
Payer: COMMERCIAL

## 2022-03-26 DIAGNOSIS — Z00.8 ENCOUNTER FOR OTHER GENERAL EXAMINATION: ICD-10-CM

## 2022-03-26 PROCEDURE — 71046 X-RAY EXAM CHEST 2 VIEWS: CPT

## 2022-03-26 PROCEDURE — 71046 X-RAY EXAM CHEST 2 VIEWS: CPT | Mod: 26

## 2022-05-16 ENCOUNTER — APPOINTMENT (OUTPATIENT)
Dept: ORTHOPEDIC SURGERY | Facility: CLINIC | Age: 57
End: 2022-05-16
Payer: OTHER MISCELLANEOUS

## 2022-05-16 VITALS — HEIGHT: 70 IN | BODY MASS INDEX: 28.06 KG/M2 | WEIGHT: 196 LBS

## 2022-05-16 DIAGNOSIS — S46.011D STRAIN OF MUSCLE(S) AND TENDON(S) OF THE ROTATOR CUFF OF RIGHT SHOULDER, SUBSEQUENT ENCOUNTER: ICD-10-CM

## 2022-05-16 DIAGNOSIS — S46.119A STRAIN OF MUSCLE, FASCIA AND TENDON OF LONG HEAD OF BICEPS, UNSPECIFIED ARM, INITIAL ENCOUNTER: ICD-10-CM

## 2022-05-16 DIAGNOSIS — S46.891D OTHER INJURY OF OTHER MUSCLES, FASCIA AND TENDONS AT SHOULDER AND UPPER ARM LEVEL, RIGHT ARM, SUBSEQUENT ENCOUNTER: ICD-10-CM

## 2022-05-16 PROCEDURE — 99072 ADDL SUPL MATRL&STAF TM PHE: CPT

## 2022-05-16 PROCEDURE — 99455 WORK RELATED DISABILITY EXAM: CPT

## 2022-05-16 NOTE — HISTORY OF PRESENT ILLNESS
[2] : 2 [0] : 0 [Full time] : Work status: full time [] : yes [de-identified] : pt is here today for a WC follow up of his left shoulder. pt states his level of pain is better than last visit. [de-identified] : home exercises  [FreeTextEntry1] : left shoulder

## 2022-05-16 NOTE — WORK
[Has the patient reached Maximum Medical Improvement? If yes, indicate date___] : Yes, on [unfilled] [Is there permanent partial impairment?] : Yes [de-identified] : Right Shoulder = 30% loss of use\par 10% = Loss of IR & ER,\par 10% = Loss of adduction & extension, and\par 10% = Atrophy.\par Repeated objective measures were obtained.

## 2022-05-16 NOTE — PHYSICAL EXAM
[Right] : right shoulder [Left] : left shoulder [Mild] : mild [Sitting] : sitting [] : no atrophy [FreeTextEntry9] : IR to T8 [TWNoteComboBox6] : internal rotation L3 [TWNoteComboBox4] : passive forward flexion 165 degrees [de-identified] : external rotation 70 degrees

## 2022-05-16 NOTE — ASSESSMENT
[FreeTextEntry1] : .\par We discussed the course.\par The patient has reached MMI.\par Cont HEP reviewed.\par Medication use discussed.\par Questions were answered.\par F/u is at the patient's request.\par

## 2022-06-09 ENCOUNTER — APPOINTMENT (OUTPATIENT)
Dept: HEPATOLOGY | Facility: CLINIC | Age: 57
End: 2022-06-09

## 2022-07-20 LAB
ALBUMIN SERPL ELPH-MCNC: 4.7 G/DL
ALP BLD-CCNC: 83 U/L
ALT SERPL-CCNC: 131 U/L
ANION GAP SERPL CALC-SCNC: 10 MMOL/L
AST SERPL-CCNC: 58 U/L
BASOPHILS # BLD AUTO: 0.02 K/UL
BASOPHILS NFR BLD AUTO: 0.4 %
BILIRUB SERPL-MCNC: 0.5 MG/DL
BUN SERPL-MCNC: 19 MG/DL
CALCIUM SERPL-MCNC: 9.5 MG/DL
CHLORIDE SERPL-SCNC: 103 MMOL/L
CHOLEST SERPL-MCNC: 234 MG/DL
CO2 SERPL-SCNC: 27 MMOL/L
CREAT SERPL-MCNC: 1.11 MG/DL
EGFR: 77 ML/MIN/1.73M2
EOSINOPHIL # BLD AUTO: 0.12 K/UL
EOSINOPHIL NFR BLD AUTO: 2.2 %
ESTIMATED AVERAGE GLUCOSE: 117 MG/DL
FERRITIN SERPL-MCNC: 184 NG/ML
GLUCOSE SERPL-MCNC: 112 MG/DL
HBA1C MFR BLD HPLC: 5.7 %
HCT VFR BLD CALC: 46.4 %
HDLC SERPL-MCNC: 70 MG/DL
HGB BLD-MCNC: 15.5 G/DL
IMM GRANULOCYTES NFR BLD AUTO: 0.4 %
INR PPP: 0.93 RATIO
IRON SATN MFR SERPL: 18 %
IRON SERPL-MCNC: 66 UG/DL
LDLC SERPL CALC-MCNC: 149 MG/DL
LYMPHOCYTES # BLD AUTO: 2.22 K/UL
LYMPHOCYTES NFR BLD AUTO: 41.3 %
MAN DIFF?: NORMAL
MCHC RBC-ENTMCNC: 29 PG
MCHC RBC-ENTMCNC: 33.4 GM/DL
MCV RBC AUTO: 86.9 FL
MONOCYTES # BLD AUTO: 0.44 K/UL
MONOCYTES NFR BLD AUTO: 8.2 %
NEUTROPHILS # BLD AUTO: 2.56 K/UL
NEUTROPHILS NFR BLD AUTO: 47.5 %
NONHDLC SERPL-MCNC: 165 MG/DL
PLATELET # BLD AUTO: 241 K/UL
POTASSIUM SERPL-SCNC: 4.6 MMOL/L
PROT SERPL-MCNC: 7 G/DL
PT BLD: 10.8 SEC
RBC # BLD: 5.34 M/UL
RBC # FLD: 13.1 %
SODIUM SERPL-SCNC: 141 MMOL/L
TIBC SERPL-MCNC: 362 UG/DL
TRIGL SERPL-MCNC: 77 MG/DL
UIBC SERPL-MCNC: 296 UG/DL
WBC # FLD AUTO: 5.38 K/UL

## 2022-08-02 LAB
ALPHA-1-FETOPROTEIN-L3: NORMAL %
ALPHA-1-FETOPROTEIN: 1.4 NG/ML

## 2022-08-11 ENCOUNTER — APPOINTMENT (OUTPATIENT)
Dept: MRI IMAGING | Facility: IMAGING CENTER | Age: 57
End: 2022-08-11

## 2022-08-11 ENCOUNTER — OUTPATIENT (OUTPATIENT)
Dept: OUTPATIENT SERVICES | Facility: HOSPITAL | Age: 57
LOS: 1 days | End: 2022-08-11
Payer: COMMERCIAL

## 2022-08-11 ENCOUNTER — RESULT REVIEW (OUTPATIENT)
Age: 57
End: 2022-08-11

## 2022-08-11 DIAGNOSIS — K76.0 FATTY (CHANGE OF) LIVER, NOT ELSEWHERE CLASSIFIED: ICD-10-CM

## 2022-08-11 PROCEDURE — 76391 MR ELASTOGRAPHY: CPT

## 2022-08-11 PROCEDURE — 74183 MRI ABD W/O CNTR FLWD CNTR: CPT | Mod: 26

## 2022-08-11 PROCEDURE — 76391 MR ELASTOGRAPHY: CPT | Mod: 26

## 2022-08-11 PROCEDURE — A9585: CPT

## 2022-08-11 PROCEDURE — 74183 MRI ABD W/O CNTR FLWD CNTR: CPT

## 2022-08-19 ENCOUNTER — NON-APPOINTMENT (OUTPATIENT)
Age: 57
End: 2022-08-19

## 2023-01-10 ENCOUNTER — NON-APPOINTMENT (OUTPATIENT)
Age: 58
End: 2023-01-10

## 2023-05-01 ENCOUNTER — NON-APPOINTMENT (OUTPATIENT)
Age: 58
End: 2023-05-01

## 2023-05-22 ENCOUNTER — NON-APPOINTMENT (OUTPATIENT)
Age: 58
End: 2023-05-22

## 2023-06-19 ENCOUNTER — TRANSCRIPTION ENCOUNTER (OUTPATIENT)
Age: 58
End: 2023-06-19

## 2023-06-20 ENCOUNTER — APPOINTMENT (OUTPATIENT)
Dept: RADIOLOGY | Facility: CLINIC | Age: 58
End: 2023-06-20
Payer: COMMERCIAL

## 2023-06-20 ENCOUNTER — OUTPATIENT (OUTPATIENT)
Dept: OUTPATIENT SERVICES | Facility: HOSPITAL | Age: 58
LOS: 1 days | End: 2023-06-20
Payer: COMMERCIAL

## 2023-06-20 DIAGNOSIS — Z00.8 ENCOUNTER FOR OTHER GENERAL EXAMINATION: ICD-10-CM

## 2023-06-20 PROCEDURE — 71046 X-RAY EXAM CHEST 2 VIEWS: CPT | Mod: 26

## 2023-06-20 PROCEDURE — 71046 X-RAY EXAM CHEST 2 VIEWS: CPT

## 2023-10-16 ENCOUNTER — APPOINTMENT (OUTPATIENT)
Dept: HEPATOLOGY | Facility: CLINIC | Age: 58
End: 2023-10-16
Payer: COMMERCIAL

## 2023-10-16 VITALS
HEIGHT: 70 IN | OXYGEN SATURATION: 98 % | SYSTOLIC BLOOD PRESSURE: 145 MMHG | WEIGHT: 200 LBS | HEART RATE: 77 BPM | TEMPERATURE: 98 F | BODY MASS INDEX: 28.63 KG/M2 | DIASTOLIC BLOOD PRESSURE: 92 MMHG

## 2023-10-16 DIAGNOSIS — Z00.00 ENCOUNTER FOR GENERAL ADULT MEDICAL EXAMINATION W/OUT ABNORMAL FINDINGS: ICD-10-CM

## 2023-10-16 DIAGNOSIS — R76.8 OTHER SPECIFIED ABNORMAL IMMUNOLOGICAL FINDINGS IN SERUM: ICD-10-CM

## 2023-10-16 PROCEDURE — 99214 OFFICE O/P EST MOD 30 MIN: CPT

## 2023-10-23 LAB
AFP-TM SERPL-MCNC: <1.8 NG/ML
ALBUMIN SERPL ELPH-MCNC: 4.9 G/DL
ALP BLD-CCNC: 71 U/L
ALT SERPL-CCNC: 82 U/L
ANION GAP SERPL CALC-SCNC: 15 MMOL/L
AST SERPL-CCNC: 44 U/L
BASOPHILS # BLD AUTO: 0.02 K/UL
BASOPHILS NFR BLD AUTO: 0.4 %
BILIRUB DIRECT SERPL-MCNC: 0.2 MG/DL
BILIRUB SERPL-MCNC: 1 MG/DL
BUN SERPL-MCNC: 14 MG/DL
CALCIUM SERPL-MCNC: 9.4 MG/DL
CHLORIDE SERPL-SCNC: 101 MMOL/L
CHOLEST SERPL-MCNC: 226 MG/DL
CO2 SERPL-SCNC: 22 MMOL/L
CREAT SERPL-MCNC: 0.97 MG/DL
EGFR: 90 ML/MIN/1.73M2
EOSINOPHIL # BLD AUTO: 0.13 K/UL
EOSINOPHIL NFR BLD AUTO: 2.6 %
ESTIMATED AVERAGE GLUCOSE: 114 MG/DL
FERRITIN SERPL-MCNC: 434 NG/ML
GLUCOSE SERPL-MCNC: 96 MG/DL
HBA1C MFR BLD HPLC: 5.6 %
HBV SURFACE AG SER QL: NONREACTIVE
HCT VFR BLD CALC: 43.4 %
HCV AB SER QL: NONREACTIVE
HCV S/CO RATIO: 0.04 S/CO
HDLC SERPL-MCNC: 81 MG/DL
HEPATITIS A IGG ANTIBODY: REACTIVE
HEPB DNA PCR INT: NOT DETECTED
HEPB DNA PCR LOG: NOT DETECTED LOGIU/ML
HGB BLD-MCNC: 15.7 G/DL
IMM GRANULOCYTES NFR BLD AUTO: 0.2 %
INR PPP: 0.98 RATIO
IRON SATN MFR SERPL: 35 %
IRON SERPL-MCNC: 133 UG/DL
LDLC SERPL CALC-MCNC: 132 MG/DL
LYMPHOCYTES # BLD AUTO: 2.2 K/UL
LYMPHOCYTES NFR BLD AUTO: 43.6 %
MAN DIFF?: NORMAL
MCHC RBC-ENTMCNC: 31 PG
MCHC RBC-ENTMCNC: 36.2 GM/DL
MCV RBC AUTO: 85.8 FL
MONOCYTES # BLD AUTO: 0.4 K/UL
MONOCYTES NFR BLD AUTO: 7.9 %
NEUTROPHILS # BLD AUTO: 2.29 K/UL
NEUTROPHILS NFR BLD AUTO: 45.3 %
NONHDLC SERPL-MCNC: 145 MG/DL
PLATELET # BLD AUTO: 180 K/UL
POTASSIUM SERPL-SCNC: 4.7 MMOL/L
PROT SERPL-MCNC: 6.9 G/DL
PT BLD: 11.1 SEC
RBC # BLD: 5.06 M/UL
RBC # FLD: 12.2 %
SODIUM SERPL-SCNC: 138 MMOL/L
TIBC SERPL-MCNC: 382 UG/DL
TRIGL SERPL-MCNC: 72 MG/DL
UIBC SERPL-MCNC: 249 UG/DL
WBC # FLD AUTO: 5.05 K/UL

## 2023-12-16 ENCOUNTER — APPOINTMENT (OUTPATIENT)
Dept: MRI IMAGING | Facility: IMAGING CENTER | Age: 58
End: 2023-12-16
Payer: COMMERCIAL

## 2023-12-16 ENCOUNTER — OUTPATIENT (OUTPATIENT)
Dept: OUTPATIENT SERVICES | Facility: HOSPITAL | Age: 58
LOS: 1 days | End: 2023-12-16
Payer: COMMERCIAL

## 2023-12-16 ENCOUNTER — RESULT REVIEW (OUTPATIENT)
Age: 58
End: 2023-12-16

## 2023-12-16 DIAGNOSIS — K76.0 FATTY (CHANGE OF) LIVER, NOT ELSEWHERE CLASSIFIED: ICD-10-CM

## 2023-12-16 PROCEDURE — 74183 MRI ABD W/O CNTR FLWD CNTR: CPT

## 2023-12-16 PROCEDURE — 74183 MRI ABD W/O CNTR FLWD CNTR: CPT | Mod: 26

## 2023-12-16 PROCEDURE — 76391 MR ELASTOGRAPHY: CPT | Mod: 26

## 2023-12-16 PROCEDURE — 76391 MR ELASTOGRAPHY: CPT

## 2023-12-27 ENCOUNTER — NON-APPOINTMENT (OUTPATIENT)
Age: 58
End: 2023-12-27

## 2024-01-25 ENCOUNTER — APPOINTMENT (OUTPATIENT)
Dept: HEPATOLOGY | Facility: CLINIC | Age: 59
End: 2024-01-25
Payer: COMMERCIAL

## 2024-01-25 VITALS
TEMPERATURE: 98.3 F | WEIGHT: 208 LBS | SYSTOLIC BLOOD PRESSURE: 168 MMHG | OXYGEN SATURATION: 99 % | HEIGHT: 70 IN | HEART RATE: 84 BPM | BODY MASS INDEX: 29.78 KG/M2 | DIASTOLIC BLOOD PRESSURE: 92 MMHG | RESPIRATION RATE: 16 BRPM

## 2024-01-25 DIAGNOSIS — K76.0 FATTY (CHANGE OF) LIVER, NOT ELSEWHERE CLASSIFIED: ICD-10-CM

## 2024-01-25 DIAGNOSIS — E83.110 HEREDITARY HEMOCHROMATOSIS: ICD-10-CM

## 2024-01-25 PROCEDURE — 99214 OFFICE O/P EST MOD 30 MIN: CPT

## 2024-12-10 ENCOUNTER — NON-APPOINTMENT (OUTPATIENT)
Age: 59
End: 2024-12-10